# Patient Record
Sex: MALE | Race: WHITE | NOT HISPANIC OR LATINO | Employment: OTHER | URBAN - METROPOLITAN AREA
[De-identification: names, ages, dates, MRNs, and addresses within clinical notes are randomized per-mention and may not be internally consistent; named-entity substitution may affect disease eponyms.]

---

## 2017-08-07 ENCOUNTER — OFFICE VISIT (OUTPATIENT)
Dept: OTOLARYNGOLOGY | Facility: CLINIC | Age: 62
End: 2017-08-07

## 2017-08-07 VITALS — BODY MASS INDEX: 39.4 KG/M2 | WEIGHT: 260 LBS | HEIGHT: 68 IN

## 2017-08-07 DIAGNOSIS — R49.0 MUSCLE TENSION DYSPHONIA: Primary | ICD-10-CM

## 2017-08-07 DIAGNOSIS — R49.0 DYSPHONIA: Primary | ICD-10-CM

## 2017-08-07 RX ORDER — LISINOPRIL AND HYDROCHLOROTHIAZIDE 12.5; 2 MG/1; MG/1
TABLET ORAL
COMMUNITY
Start: 2017-03-14 | End: 2017-08-28

## 2017-08-07 RX ORDER — IBUPROFEN 800 MG/1
800 TABLET, FILM COATED ORAL
COMMUNITY
Start: 2014-12-22 | End: 2019-02-08

## 2017-08-07 RX ORDER — CITALOPRAM HYDROBROMIDE 20 MG/1
20 TABLET ORAL
COMMUNITY
Start: 2016-12-27 | End: 2017-10-06

## 2017-08-07 ASSESSMENT — PAIN SCALES - GENERAL: PAINLEVEL: NO PAIN (0)

## 2017-08-07 NOTE — LETTER
8/7/2017       RE: Jarad Javier  [address]  [facility] [place] [address]     Dear Colleague,    Thank you for referring your patient, Jarad Javier, to the [facility]. Please see a copy of my visit note below.    [facility]  Michoacano Azul Jr., M.D., F.A.C.S.  Liseth Cordova M.D., M.P.H.  Carolina Abraham, Ph.D., CCC/SLP  Deandra Hoffman M.M. (voice), M.A., CCC/SLP  Joe Cortes M.M. (voice), M.A., CCC/SLP    Patient: Jarad Javier  Date of Visit: 8/7/2017    CHIEF COMPLAINT: Dysphonia    HISTORY  Jarad Javier was seen for initial voice evaluation and laryngeal examination in conjunction with a visit to Dr. Cordova.  Please refer to Dr. Cordova s dictation for additional history and impressions. Salient details of his history are as follows:    Mr. Javier is a 61 year old male with a history of dysphonia.    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    CURRENT SYMPTOMS INCLUDE  VOICE    10 year Hx of gradually worsening voice issues    Increased effort while talking and singing; noticed that his singing became more difficult and has significantly affected his confidence.    Singing in choir ([employer]) and having a yearly voice check is very stressful; especially while singing solos.  It was suggested that he may need to transition to Tenor 2 during his choir voice check last year, but he does not want to make the transition.    Voice fatigues easily (becoming hoarse), breaks, change in pitch and range.    Noted that he does not have any formal training/ private voice lessons for his singing voice.    Currently retired; tends to be quiet for long periods of his  day    Recently retired as a school psychologist in ; minimal speaking  COUGH/THROAT CLEARING    Seasonal coughing; worst in dry weather  SWALLOWING/THROAT SYMPTOMS    Denies any swallowing issues    Experiences a chronic dry throat sensation; has used Celexa for a while and tries to hydrate well    Denies any associated throat discomfort with speaking or singing.  BREATHING    n/a  ADDITIONAL    Daily heart burn symptoms; no consistent medications used    Feels constant drainage in the Winter and Spring out the front of his nose.     OTHER PERTINENT HISTORY    Please also refer to Dr. Cordova s dictation for additional history and impressions.    Lisniopril; denies any connection to his cough    Celexa - stable  Past Medical History:   Diagnosis Date     Anxiety     I take Citalopram     Arthritis     Some in hands but mostly feet, big toe joints     Gastroesophageal reflux disease ?    Take antacids when needed     Hypertension 1970's    Borderline high to high, white coat syndrome     Past Surgical History:   Procedure Laterality Date     ADENOIDECTOMY  's    When I was about 8     TONSILLECTOMY  's    When I was about 8       OBJECTIVE  PATIENT REPORTED MEASURES    Effort to talk: 2 / 10 (0-10 in which 10 represents maximal effort)    Effort to sin / 10 (0-10 in which 10 represents maximal effort)    Voice quality: 7 / 10 (0-10 in which 10 represents best possible voice)  Patient Supplied Answers To VHI Questionnaire  Voice Handicap Index (VHI-10) 2017   How often do you have any of the following symptoms:  Indigestion, heartburn, chest pain, stomach acid coming up, and/or tasting acid in your mouth or throat? Daily   (F1) My voice makes it difficult for people to hear me. Never   (F2) People have difficulty understanding me in a noisy room. Almost never   (F8) My voice difficulties restrict my personal and social life. Never   (F9) I feel left out of conversations because of my  "voice. Never   (F10) My voice problem causes me to lose income. Never   (P5) I feel as though I have to strain to produce voice. Sometimes   (P6) The clarity of my voice is unpredictable. Sometimes   (E4) My voice problem upsets me. Almost always   (E6) My voice makes me feel handicapped. Almost always   (P3) People ask, \"What's wrong with your voice?\" Never   VHI Total Score 11       Patient Supplied Answers To CSI Questionnaire  Cough Severity Index (CSI) 7/24/2017   My cough is worse when I lie down. Almost never   My coughing problem causes me to restrict my personal and social life. Never   I tend to avoid places because of my cough problem. Never   I feel embarrassed because of my coughing problem. Almost never   People ask, \"What's wrong?\" because I cough a lot. Never   I run out of air when I cough. Never   My coughing problem affects my voice. Almost never   My coughing problem limits my physical activity. Never   My coughing problem upsets me. Never   People ask me if I am sick because I cough a lot. Almost never   CSI Total Score 4       Patient Supplied Answers To EAT Questionnaire  Eating Assessment Tool (EAT-10) 7/24/2017   My swallowing problem has caused me to lose weight. 0   My swallowing problem interferes with my ability to go out for meals. 0   Swallowing solids takes extra effort. 0   Swallowing pills takes extra effort. 0   Swallowing is painful. 0   The pleasure of eating is affected by my swallowing. 0   When I swallow food sticks in my throat. 0   I cough when I eat. 0   Swallowing is stressful. 0   How often do things go down the wrong way when you swallow? Rarely   Have you had pneumonia, bronchitis, or another severe lung infection in the last 6 months? No   EAT Total Score 0     PERCEPTUAL EVALUATION (CPT 30898)  POSTURE / TENSION:     upper body    BREATHING:     appears within normal limits and adequate     thoracic muscle use pattern     LARYNGEAL PALPATION:     firm " musculature    reduced thyrohyoid space    VOICE:    Roughness: Mild Intermittent    Breathiness: WNL    Strain: WNL with speech    Loudness    Conversational speech:  WNL    Projected speech:  WNL    Pitch:    Conversational speech:  WNL; sounds comfortable and in a typical range for a Tenor voice    Pitch glide: middle passaggio break; mild to moderate roughness, breathiness and strain in upper range.    Resonance:    Conversational speech:  WNL    Singing vs. Speech:  Demonstrates an improved voice quality while singing    CAPE-V Overall Severity:  25/100;  Global Dysphonia Severity:  52/100    COUGH/THROAT CLEARING:    Not observed    LARYNGEAL EXAMINATION  Procedure: Flexible endoscopy with chip-tip technology with stroboscopy, left nostril; topical anesthesia with 3% Lidocaine and 0.25% phenylephrine was applied.   Performed by: Dr. Cordova   The laryngeal and pharyngeal structures were evaluated for gross appearance, mobility, function, and focal lesions / abnormalities of the associated mucosa.  Stroboscopy was warranted to evaluate closure, symmetry, and vibratory characteristics of the vocal folds.  All findings were within normal limits with the exception of the following salient features:     Relatively healthy larynx with normal mobility    Mild anterior glottic gap intermittently observed    Mild to moderate supraglottic hyperfunction.    Stroboscopy demonstrates:    Amplitude: Mildly reduced bilaterally    Symmetry: Mild intermittent asymmetry    Mucosal wave: WNL    Phase: mild intermittent phase asymmetry    Mild anterior glottic gap observed.  It is difficult to determine whether it is related to muscle tension, or mild atrophy, but inhalation phonation suggests that it is related to muscle tension.  In addition:    Glottic adduction improved with glottic coups, as well as verbal cues to improve coordination between breath flow and speech.    Glides, and resonant phonemes /m/ and /n/ were more  "facilitating than \"ng\".      Base of tongue tension was noted during his laryngeal exam today.      Abducted view of a healthy larynx    The laryngeal exam was reviewed with Mr. Javier, and I provided pertinent explanations, as well as written and oral information.    ASSESSMENT / PLAN  IMPRESSIONS: R49.0 (Dysphonia)     Laryngeal evaluation demonstrated supraglottic hyperfunction.    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    Dysphonia/discomfort is accounted for by the hyperfunction and imbalanced function of the intrinsic and extrinsic laryngeal musculature      Cough/throat clear are accounted for by the hypersensitivity of the larynx and pharynx as evidenced by case history, patient complaints and absence of other organic findings; hypersensitivity is compounded by imbalance in the function of the intrinsic and extrinsic laryngeal musculature during connected speech  STIMULABILITY: results of therapy probes during perceptual and laryngeal evaluation demonstrate improvement with use of forward resonant stimuli, coordination of respiration and phonation, use of glottic coup to promote vocal fold closure, use of yawn sigh and use of inhalation phonation  RECOMMENDATIONS:     A course of speech therapy is recommended to optimize vocal technique, improve voice quality, promote reduced discomfort, effort and fatigue and help reduce mucosal irritation.    He demonstrates a Good prognosis for improvement given adherence to therapeutic recommendations. Therapeutic     Positive indicators: motivation; positive response to therapeutic probes    Negative indicators: none    DURATION / FREQUENCY: 2 one-hour weekly sessions, followed by 2 bi-weekly sessions.  A total of 6-8 sessions may be necessary to resolve symptoms to within normal limits.    GOALS:  Patient goal:   To improve and maintain a healthy voice quality  To understand the " problem and fix it as much as possible  To recover his former voice quality for all daily activities    Short-term goal(s): Within the first 4 sessions, Mr. Javier:  1. will be able to independently list key factors in maintenance of good vocal hygiene with 80% accuracy, and report on their use outside the therapy room.  2. will utilize silent inhalation with good low-respiratory engagement 75% of the time during therapy tasks with minimal clinician support  3. will demonstrate semi-occluded vocal tract (SOVT) exercises with at least 80% accuracy with no clinician support    Long-term goal(s): In 6 months, Mr. Javier will:  1. Report a week of typical vocal activities, in which dysphonia and discomfort do not exceed a level of 3 out of 10, 80% of the time     This treatment plan was developed with the patient who agreed with the recommendations.    TOTAL SERVICE:  EVALUATION OF VOICE AND RESONANCE: (64894): 45 minutes    NO CHARGE FACILITY FEE (26501)    Deandra Hoffman M.M. (voice), M.A., CCC/SLP  Speech-Language Pathologist  [facility]  [phone]    Again, thank you for allowing me to participate in the care of your patient.      Sincerely,    Deandra Hoffman, SLP

## 2017-08-07 NOTE — LETTER
8/7/2017      RE: Jarad Javier  [address]  [facility] [address]       Dear Colleague:    I had the pleasure of meeting Jarad Javier in consultation at the [facility] of the [facility] on a self-referred basis, for evaluation of dysphonia. The note from our visit follows.  I appreciate the opportunity to participate in the care of this pleasant patient.    Please feel free to contact me with any questions.    Sincerely yours,    Liseth Cordova M.D., M.P.H.  , Laryngology  Director, [facility]  Otolaryngology- Head & Neck Surgery  [phone]          =====    History of Present Illness:   Jarad Javier is a pleasant 61-year-old male with a history of arthritis, anxiety, and GERD who presents with a 10 year history of dysphonia. Symptoms include increased effort to talk/sing, dry throat, voice tires easily, voice breaks, change in vocal pitch and change in range.      Voice  He reports voice problems for about 10 years, with gradual onset. He has noted that signing is becoming more difficult. The problem is worsening over time.  His typical voice use is undemanding. He reports that he recently retired so his vocal demands have decreased, but he sings in choir so the problem affects his quality of life. The problem causes stress and he has difficulty with even moderately high notes. He has passagio difficulties. He wonders if he has an anatomical issue or if this is more related to technique or the fact that he gets stressed and twisted into knots about the problem.      Swallowing  No concerns.       Cough/Throat-clearing  He notes some cough/ throat-clearing especially at certain times of the year, especially in the winter when it feels dry, and in the fall and spring as well. At those times he develops a dry cough along with rhinitis. This problem did not coincide with starting  lisinopril. A prior allergy workup was negative. He does occasionally do saline nasal rinses.      Breathing  No concerns.       Throat discomfort  No concerns.       Reflux-type symptoms  He experiences heartburn/indigestion daily. He is sometimes taking reflux medications. He does note that omeprazole helps. He has not noted any relationship between reflux symptoms and vocal difficulty. He does note that he is aware of his dietary triggers.      MEDICATIONS:     Current Outpatient Prescriptions   Medication Sig Dispense Refill     cholecalciferol (VITAMIN D3) 5000 UNITS CAPS capsule Take 5,000 Units by mouth       citalopram (CELEXA) 20 MG tablet Take 20 mg by mouth       coenzyme Q-10 10 MG CAPS        ibuprofen (ADVIL/MOTRIN) 800 MG tablet Take 800 mg by mouth       lisinopril-hydrochlorothiazide (PRINZIDE/ZESTORETIC) 20-12.5 MG per tablet TAKE ONE TABLET BY MOUTH EVERY DAY       omeprazole (PRILOSEC) 20 MG CR capsule Take 20 mg by mouth         ALLERGIES:  No Known Allergies    PAST MEDICAL HISTORY:   Past Medical History:   Diagnosis Date     Anxiety 2013    I take Citalopram     Arthritis 2002    Some in hands but mostly feet, big toe joints     Gastroesophageal reflux disease 2012?    Take antacids when needed     Hypertension 1970's    Borderline high to high, white coat syndrome        PAST SURGICAL HISTORY:   Past Surgical History:   Procedure Laterality Date     ADENOIDECTOMY  1960's    When I was about 8     TONSILLECTOMY  1960's    When I was about 8       HABITS/SOCIAL HISTORY:    Social History   Substance Use Topics     Smoking status: Never Smoker     Smokeless tobacco: Never Used     Alcohol use Yes      Comment: Very occasional         FAMILY HISTORY:    Family History   Problem Relation Age of Onset     Hypertension Mother      Asthma Mother        REVIEW OF SYSTEMS:  The patient completed a comprehensive 11 point review of systems (below), which was reviewed. Positives are as noted below;  pertinent findings are as noted in the HPI.     Patient Supplied Answers to Review of Systems   ENT ROS 7/24/2017   Ears, Nose, Throat Nasal congestion or drainage   Gastrointestinal/Genitourinary Heartburn/indigestion   Musculoskeletal Swollen joints, Back pain       PHYSICAL EXAMINATION:  General: The patient was alert and conversant, and in no acute distress.    Eyes: PERRL, conjunctiva and lids normal, sclera nonicteric.  Nose: Anterior rhinoscopy: no gross abnormalities. no  bleeding; no  mucopurulence; septum grossly normal, mild mucoid drainage and/or crusting.  Oral cavity/oropharynx: No masses or lesions. Dentition in fair condition. Floor of mouth and oral tongue soft to palpation. Tongue mobility and palate elevation intact and symmetric.  Ears: Normal auricles, external auditory canals bilaterally. Visualized portions of tympanic membranes normal bilaterally.   Neck: No palpable cervical lymphadenopathy. There was mild tenderness to palpation of the thyrohyoid space, which was narrow. No obvious thyroid abnormality. Landmarks palpable. Bilateral moderate posterior neck tightness.  Resp: Breathing comfortably, no stridor or stertor.  Neuro: Symmetric facial function. Other cranial nerves as documented above.  Psych: Normal affect, pleasant and cooperative.  Voice/speech: Mild dysphonia characterized by breathiness and roughness, occasional mild tremulous quality.  Extremities: No cyanosis, clubbing, or edema of the upper extremities.     Intake scores  Total Score for Last Patient-Answered VHI Questionnaire  VHI Total Score 7/24/2017   VHI Total Score 11     Total Score for Last Patient-Answered EAT Questionnaire  EAT Total Score 7/24/2017   EAT Total Score 0     Total Score for Last Patient-Answered CSI Questionnaire  CSI Total Score 7/24/2017   CSI Total Score 4       PROCEDURE:   Flexible fiberoptic laryngoscopy and laryngovideostroboscopy  Indications: This procedure was warranted to evaluate the  patient's laryngeal function. Risks, benefits, and alternatives were discussed.  Description: After written informed consent was obtained, a time-out was performed to confirm patient identity, procedure, and procedure site. Topical 3% lidocaine with 0.25% phenylephrine was applied to the nasal cavities. I performed the endoscopy and no complications were apparent. Continuous and stroboscopic light were utilized to assess routine phonation and variable frequency phonation.  Performed by: Liseth Cordova MD MPH  SLP: Deandra Hoffman MM, MA, CCC-SLP  Findings: Normal nasopharynx. Normal base of tongue, valleculae, and epiglottis. Vocal fold mobility: right: normal; left: normal. Medial edges of the vocal folds: smooth and straight. No focal mucosal lesions were observed on the true vocal folds. Glissade produced appropriate elongation. There was mild to moderate supraglottic recruitment with connected speech. Mucosa of false vocal folds, aryepiglottic folds, piriform sinuses, and posterior glottis unremarkable. Airway was patent. Response to the therapy probes was good.      The addition of stroboscopy allowed evaluation of the mucosal wave.   Amplitude: right: normal; left: normal. Symmetry: intermittent symmetry. Closure pattern: complete. Closure plane: at glottic level. Phase distribution: normal. Very subtle medial vocal fold irregularity bilaterally. Visible improvement with instructions to reduce effort and increase airflow.      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 Although his voice perceptually is sometimes suggestive of bowing, on stroboscopy he achieved good vibratory function with instruction.     I recommended speech therapy as initial primary management, with goals including improving laryngeal hygiene, improving laryngeal efficiency, improving respiratory/phonatory coordination and  improving phonatory mechanics.     He will return as needed. I appreciate the opportunity to participate in the care of this pleasant patient.              Liseth Cordova MD

## 2017-08-07 NOTE — NURSING NOTE
Chief Complaint   Patient presents with     Consult     dysphonia     Nikole Ramsey Medical Assistant

## 2017-08-07 NOTE — MR AVS SNAPSHOT
After Visit Summary   8/7/2017    Jarad Javier    MRN: [id number]           Patient Information     Date Of Birth          1955        Visit Information        Provider Department      8/7/2017 10:10 AM Deandra Hoffman SLP [facility]         Follow-ups after your visit        Your next 10 appointments already scheduled     Aug 21, 2017  8:00 AM CDT   (Arrive by 7:45 AM)   NEW SLEEP [facility]ICE with [facility]    [address] [place] [id number] [phone]            Sep 05, 2017  2:00 PM CDT   (Arrive by 1:45 PM)   NEW SLEEP VOICE with [facility]    [address] [place] [id number] [phone]            Sep 18, 2017  8:00 AM CDT   (Arrive by 7:45 AM)   NEW SLEEP VOICE with [facility]    [address] [place] [address] [id number] [phone]            Oct 09, 2017  8:00 AM CDT   (Arrive by 7:45 AM)   NEW SLEEP VOICE with [facility]    [address] [place] [address] [id number] [phone]              Who to contact     Please call your clinic at [phone] to:    Ask questions about your health    Make or cancel appointments    Discuss your medicines    Learn about your test results    Speak to your doctor   If you have compliments or concerns about an experience at your clinic, or if you wish to file a complaint, please contact [facility] Patient Relations at [phone] or email us at [email or web address]         Additional Information About Your Visit        MyChart Information     MyChart gives you secure access to your electronic health record. If you see a primary care provider, you can also send  messages to your care team and make appointments. If you have questions, please call your primary care clinic.  If you do not have a primary care provider, please call [phone] and they will assist you.      [employer] is an electronic gateway that provides easy, online access to your medical records. With [employer], you can request a clinic appointment, read your test results, renew a prescription or communicate with your care team.     To access your existing account, please contact your [facility] or call [phone] for assistance.        Care EveryWhere ID     This is your Care EveryWhere ID. This could be used by other organizations to access your [facility] medical records  [id number]         Blood Pressure from Last 3 Encounters:   No data found for BP    Weight from Last 3 Encounters:   08/07/17 117.9 kg (260 lb)              Today, you had the following     No orders found for display       Primary Care Provider    Doctor Unknown, MD       No address on file        Equal Access to Services     : Hadii aad enedelia lantiguao Sojose, waaxda luqadaha, qaybta kaalmada adeabrahanyada, ethan gould . So [facility] [phone].    ATENCIÓN: Si habla español, tiene a ramirez disposición servicios gratuitos de asistencia lingüística. Llame al [phone].    We comply with applicable federal civil rights laws and [place] laws. We do not discriminate on the basis of race, color, national origin, age, disability sex, sexual orientation or gender identity.            Thank you!     Thank you for choosing [facility]  for your care. Our goal is always to provide you with excellent care. Hearing back from our patients is one way we can continue to improve our services. Please take a few minutes to complete the written survey that you may receive in the mail after your visit with us. Thank you!             Your Updated Medication List - Protect others around you:  Learn how to safely use, store and throw away your medicines at www.disposemymeds.org.          This list is accurate as of: 8/7/17 11:19 AM.  Always use your most recent med list.                   Brand Name Dispense Instructions for use Diagnosis    cholecalciferol 5000 UNITS Caps capsule    vitamin D3     Take 5,000 Units by mouth        citalopram 20 MG tablet    celeXA     Take 20 mg by mouth        coenzyme Q-10 10 MG Caps           ibuprofen 800 MG tablet    ADVIL/MOTRIN     Take 800 mg by mouth        lisinopril-hydrochlorothiazide 20-12.5 MG per tablet    PRINZIDE/ZESTORETIC     TAKE ONE TABLET BY MOUTH EVERY DAY        omeprazole 20 MG CR capsule    priLOSEC     Take 20 mg by mouth

## 2017-08-07 NOTE — NURSING NOTE
Procedure: Upper aerodigestive tract endoscopy, Flexible or rigid laryngoscopy, possible stroboscopy, possible flexible endoscopic evaluation of swallowing   Reason: symptoms requiring examination   PREPROCEDURE:   Yes Patient ID verified with 2 identifiers (name and  or MRN)   Yes: Procedure and site verified with patient/designee (when able)   Yes: Accurate consent documentation in medical record   No: Marking not required. Reason: [ Procedure does not require site marking. ][ Provider is in continuous attendance with the patient from consent through completion of procedure. ][ Marking unable or refused by patient (see scanned diagram).   TIME OUT:   Yes: Time-Out performed immediately prior to starting procedure, including verbal and active participation of all team members addressing:   * Correct patient identity   * Confirmed that the correct side and site are marked   * An accurate procedure consent form   * Agreement on the procedure to be done   * Correct patient position   * Relevant images and results are properly labeled and appropriately displayed   * The need to administer antibiotics or fluids for irrigation purposes during the procedure as applicable   * Safety precations based on patient history or medication use   DURING PROCEDURE: Verification of correct person, site, and procedure occurs any time the responsibility for care of the patient is transferred to another member of the care team.   Jena West RN  P Otolaryngology/Head & Neck Surgery

## 2017-08-07 NOTE — PATIENT INSTRUCTIONS
1.  You were seen in the ENT Clinic today by Dr. Cordova.  If you have any questions or concerns after your appointment, please call [phone].  Press option #1 for scheduling related needs.  Press option #3 for Nurse advice.  2.  Please initiate speech therapy at the [facility] - [facility].     Jena ARANDA, RN  [facility] ENT   Head & Neck Surgery

## 2017-08-07 NOTE — PROGRESS NOTES
Dear Colleague:    I had the pleasure of meeting Jarad Javier in consultation at the [facility] of the [facility] on a self-referred basis, for evaluation of dysphonia. The note from our visit follows.  I appreciate the opportunity to participate in the care of this pleasant patient.    Please feel free to contact me with any questions.    Sincerely yours,    Liseth Cordova M.D., M.P.H.  , Laryngology  Director, [facility]  Otolaryngology- Head & Neck Surgery  [phone]          =====    History of Present Illness:   Jarad Javier is a pleasant 61-year-old male with a history of arthritis, anxiety, and GERD who presents with a 10 year history of dysphonia. Symptoms include increased effort to talk/sing, dry throat, voice tires easily, voice breaks, change in vocal pitch and change in range.      Voice  He reports voice problems for about 10 years, with gradual onset. He has noted that signing is becoming more difficult. The problem is worsening over time.  His typical voice use is undemanding. He reports that he recently retired so his vocal demands have decreased, but he sings in choir so the problem affects his quality of life. The problem causes stress and he has difficulty with even moderately high notes. He has passagio difficulties. He wonders if he has an anatomical issue or if this is more related to technique or the fact that he gets stressed and twisted into knots about the problem.      Swallowing  No concerns.       Cough/Throat-clearing  He notes some cough/ throat-clearing especially at certain times of the year, especially in the winter when it feels dry, and in the fall and spring as well. At those times he develops a dry cough along with rhinitis. This problem did not coincide with starting lisinopril. A prior allergy workup was negative. He does occasionally do saline nasal  rinses.      Breathing  No concerns.       Throat discomfort  No concerns.       Reflux-type symptoms  He experiences heartburn/indigestion daily. He is sometimes taking reflux medications. He does note that omeprazole helps. He has not noted any relationship between reflux symptoms and vocal difficulty. He does note that he is aware of his dietary triggers.      MEDICATIONS:     Current Outpatient Prescriptions   Medication Sig Dispense Refill     cholecalciferol (VITAMIN D3) 5000 UNITS CAPS capsule Take 5,000 Units by mouth       citalopram (CELEXA) 20 MG tablet Take 20 mg by mouth       coenzyme Q-10 10 MG CAPS        ibuprofen (ADVIL/MOTRIN) 800 MG tablet Take 800 mg by mouth       lisinopril-hydrochlorothiazide (PRINZIDE/ZESTORETIC) 20-12.5 MG per tablet TAKE ONE TABLET BY MOUTH EVERY DAY       omeprazole (PRILOSEC) 20 MG CR capsule Take 20 mg by mouth         ALLERGIES:  No Known Allergies    PAST MEDICAL HISTORY:   Past Medical History:   Diagnosis Date     Anxiety 2013    I take Citalopram     Arthritis 2002    Some in hands but mostly feet, big toe joints     Gastroesophageal reflux disease 2012?    Take antacids when needed     Hypertension 1970's    Borderline high to high, white coat syndrome        PAST SURGICAL HISTORY:   Past Surgical History:   Procedure Laterality Date     ADENOIDECTOMY  1960's    When I was about 8     TONSILLECTOMY  1960's    When I was about 8       HABITS/SOCIAL HISTORY:    Social History   Substance Use Topics     Smoking status: Never Smoker     Smokeless tobacco: Never Used     Alcohol use Yes      Comment: Very occasional         FAMILY HISTORY:    Family History   Problem Relation Age of Onset     Hypertension Mother      Asthma Mother        REVIEW OF SYSTEMS:  The patient completed a comprehensive 11 point review of systems (below), which was reviewed. Positives are as noted below; pertinent findings are as noted in the HPI.     Patient Supplied Answers to Review of  Systems   ENT ROS 7/24/2017   Ears, Nose, Throat Nasal congestion or drainage   Gastrointestinal/Genitourinary Heartburn/indigestion   Musculoskeletal Swollen joints, Back pain       PHYSICAL EXAMINATION:  General: The patient was alert and conversant, and in no acute distress.    Eyes: PERRL, conjunctiva and lids normal, sclera nonicteric.  Nose: Anterior rhinoscopy: no gross abnormalities. no  bleeding; no  mucopurulence; septum grossly normal, mild mucoid drainage and/or crusting.  Oral cavity/oropharynx: No masses or lesions. Dentition in fair condition. Floor of mouth and oral tongue soft to palpation. Tongue mobility and palate elevation intact and symmetric.  Ears: Normal auricles, external auditory canals bilaterally. Visualized portions of tympanic membranes normal bilaterally.   Neck: No palpable cervical lymphadenopathy. There was mild tenderness to palpation of the thyrohyoid space, which was narrow. No obvious thyroid abnormality. Landmarks palpable. Bilateral moderate posterior neck tightness.  Resp: Breathing comfortably, no stridor or stertor.  Neuro: Symmetric facial function. Other cranial nerves as documented above.  Psych: Normal affect, pleasant and cooperative.  Voice/speech: Mild dysphonia characterized by breathiness and roughness, occasional mild tremulous quality.  Extremities: No cyanosis, clubbing, or edema of the upper extremities.     Intake scores  Total Score for Last Patient-Answered VHI Questionnaire  VHI Total Score 7/24/2017   VHI Total Score 11     Total Score for Last Patient-Answered EAT Questionnaire  EAT Total Score 7/24/2017   EAT Total Score 0     Total Score for Last Patient-Answered CSI Questionnaire  CSI Total Score 7/24/2017   CSI Total Score 4       PROCEDURE:   Flexible fiberoptic laryngoscopy and laryngovideostroboscopy  Indications: This procedure was warranted to evaluate the patient's laryngeal function. Risks, benefits, and alternatives were  discussed.  Description: After written informed consent was obtained, a time-out was performed to confirm patient identity, procedure, and procedure site. Topical 3% lidocaine with 0.25% phenylephrine was applied to the nasal cavities. I performed the endoscopy and no complications were apparent. Continuous and stroboscopic light were utilized to assess routine phonation and variable frequency phonation.  Performed by: Liseth Cordova MD MPH  SLP: Deandra Hoffman MM, MA, CCC-SLP  Findings: Normal nasopharynx. Normal base of tongue, valleculae, and epiglottis. Vocal fold mobility: right: normal; left: normal. Medial edges of the vocal folds: smooth and straight. No focal mucosal lesions were observed on the true vocal folds. Glissade produced appropriate elongation. There was mild to moderate supraglottic recruitment with connected speech. Mucosa of false vocal folds, aryepiglottic folds, piriform sinuses, and posterior glottis unremarkable. Airway was patent. Response to the therapy probes was good.      The addition of stroboscopy allowed evaluation of the mucosal wave.   Amplitude: right: normal; left: normal. Symmetry: intermittent symmetry. Closure pattern: complete. Closure plane: at glottic level. Phase distribution: normal. Very subtle medial vocal fold irregularity bilaterally. Visible improvement with instructions to reduce effort and increase airflow.      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 Although his voice perceptually is sometimes suggestive of bowing, on stroboscopy he achieved good vibratory function with instruction.     I recommended speech therapy as initial primary management, with goals including improving laryngeal hygiene, improving laryngeal efficiency, improving respiratory/phonatory coordination and improving phonatory mechanics.     He will return as needed. I  appreciate the opportunity to participate in the care of this pleasant patient.

## 2017-08-07 NOTE — MR AVS SNAPSHOT
After Visit Summary   8/7/2017    Jarad Javier    MRN: [id number]           Patient Information     Date Of Birth          1955        Visit Information        Provider Department      8/7/2017 10:10 AM Liseth Cordova MD [facility] Ear Nose and Throat        Today's Diagnoses     Muscle tension dysphonia    -  1      Care Instructions    1.  You were seen in the ENT Clinic today by Dr. Cordova.  If you have any questions or concerns after your appointment, please call [phone].  Press option #1 for scheduling related needs.  Press option #3 for Nurse advice.  2.  Please initiate speech therapy at the [facility].     Jena ARANDA, RN  [facility] ENT   Head & Neck Surgery               Follow-ups after your visit        Additional Services     OTOLARYNGOLOGY REFERRAL       SPEECH-LANGUAGE PATHOLOGY SERVICE(S) REQUESTED:  Evaluate and treat    Carolina Abraham, Ph.D., CCC/SLP  Speech-Language Pathologist  Director, [facility]  [phone]    Deandra Hoffman M.M., M.A., CCC/SLP  Speech-Language Pathologist  [facility]  [phone]                  Your next 10 appointments already scheduled     Aug 21, 2017  8:00 AM CDT   (Arrive by 7:45 AM)   NEW SLEEP VOICE with [facility] ([facility])    [address] [place] [id number] [phone]            Sep 05, 2017  2:00 PM CDT   (Arrive by 1:45 PM)   NEW SLEEP VOICE with [facility] ([facility])    [address] [place] [id number] [phone]            Sep 18, 2017  8:00 AM CDT   (Arrive by 7:45 AM)   NEW SLEEP VOICE with [facility] ([facility])    [address] [place] [id number] [phone]            Oct 09, 2017  8:00 AM CDT  "  (Arrive by 7:45 AM)   NEW SLEEP VOICE with AAKASH [facility]   [facility] ([facility])    [address] [facility] [address] [facility] [address] [phone]              Who to contact     Please call your clinic at [phone] to:    Ask questions about your health    Make or cancel appointments    Discuss your medicines    Learn about your test results    Speak to your doctor   If you have compliments or concerns about an experience at your clinic, or if you wish to file a complaint, please contact [facility] Patient Relations at [phone] or email us at [email or web address]         Additional Information About Your Visit        [employer] Information     [employer] gives you secure access to your electronic health record. If you see a primary care provider, you can also send messages to your care team and make appointments. If you have questions, please call your primary care clinic.  If you do not have a primary care provider, please call [phone] and they will assist you.      [employer] is an electronic gateway that provides easy, online access to your medical records. With [employer], you can request a clinic appointment, read your test results, renew a prescription or communicate with your care team.     To access your existing account, please contact your [facility] or call [phone] for assistance.        Care EveryWhere ID     This is your Care EveryWhere ID. This could be used by other organizations to access your [facility] medical records  [id number]        Your Vitals Were     Height BMI (Body Mass Index)                1.727 m (5' 8\") 39.53 kg/m2           Blood Pressure from Last 3 Encounters:   No data found for BP    Weight from Last 3 Encounters:   08/07/17 117.9 kg (260 lb)              We Performed the Following     IMAGESTREAM RECORDING ORDER     LARYNGOSCOPY FLEX/RIGID W STROBOSCOPY     " OTOLARYNGOLOGY REFERRAL        Primary Care Provider    Doctor Unknown, MD       No address on file        Equal Access to Services     [facility]: Hadii ramiro mcdaniels naga Collins, loganda diontelito, gustavo walker sonnyhonorio, waxnelly saumyain hayaakip dennisabrahan ramirez laSimonbharati manas. So [facility] [phone].    ATENCIÓN: Si habla español, tiene a ramirez disposición servicios gratuitos de asistencia lingüística. Llame al [phone].    We comply with applicable federal civil rights laws and [place] laws. We do not discriminate on the basis of race, color, national origin, age, disability sex, sexual orientation or gender identity.            Thank you!     Thank you for choosing [facility]  for your care. Our goal is always to provide you with excellent care. Hearing back from our patients is one way we can continue to improve our services. Please take a few minutes to complete the written survey that you may receive in the mail after your visit with us. Thank you!             Your Updated Medication List - Protect others around you: Learn how to safely use, store and throw away your medicines at www.disposemymeds.org.          This list is accurate as of: 8/7/17 11:59 PM.  Always use your most recent med list.                   Brand Name Dispense Instructions for use Diagnosis    cholecalciferol 5000 UNITS Caps capsule    vitamin D3     Take 5,000 Units by mouth        citalopram 20 MG tablet    celeXA     Take 20 mg by mouth        coenzyme Q-10 10 MG Caps           ibuprofen 800 MG tablet    ADVIL/MOTRIN     Take 800 mg by mouth        lisinopril-hydrochlorothiazide 20-12.5 MG per tablet    PRINZIDE/ZESTORETIC     TAKE ONE TABLET BY MOUTH EVERY DAY        omeprazole 20 MG CR capsule    priLOSEC     Take 20 mg by mouth

## 2017-08-07 NOTE — PROGRESS NOTES
[facility] VOICE CLINIC  Michoacano Azul Jr., M.D., F.A.C.S.  Liseth Cordova M.D., M.P.H.  Carolina Abraham, Ph.D., CCC/SLP  Deandra Hoffman M.M. (voice), M.MARCUS., CCC/SLP  Joe Cortes M.M. (voice), M.A., CCC/SLP    Patient: Jarad Javier  Date of Visit: 8/7/2017    CHIEF COMPLAINT: Dysphonia    HISTORY  Jarad Javier was seen for initial voice evaluation and laryngeal examination in conjunction with a visit to Dr. Cordova.  Please refer to Dr. Cordova s dictation for additional history and impressions. Salient details of his history are as follows:    Mr. Javier is a 61 year old male with a history of dysphonia.    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    CURRENT SYMPTOMS INCLUDE  VOICE    10 year Hx of gradually worsening voice issues    Increased effort while talking and singing; noticed that his singing became more difficult and has significantly affected his confidence.    Singing in choir ([employer]) and having a yearly voice check is very stressful; especially while singing solos.  It was suggested that he may need to transition to Tenor 2 during his choir voice check last year, but he does not want to make the transition.    Voice fatigues easily (becoming hoarse), breaks, change in pitch and range.    Noted that he does not have any formal training/ private voice lessons for his singing voice.    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  BREATHING    n/a  ADDITIONAL    Daily heart burn symptoms; no consistent medications used    Feels constant drainage in the Winter and Spring out the front of his nose.     OTHER PERTINENT HISTORY    Please also refer to Dr. Cordova s dictation for additional history and impressions.    Lisniopril; denies any connection to his cough    Celexa - stable  Past Medical History:   Diagnosis Date     Anxiety     I take Citalopram     Arthritis     Some in hands but mostly feet, big toe joints     Gastroesophageal reflux disease ?    Take antacids when needed     Hypertension 's    Borderline high to high, white coat syndrome     Past Surgical History:   Procedure Laterality Date     ADENOIDECTOMY  's    When I was about 8     TONSILLECTOMY  's    When I was about 8       OBJECTIVE  PATIENT REPORTED MEASURES    Effort to talk: 2 / 10 (0-10 in which 10 represents maximal effort)    Effort to sin / 10 (0-10 in which 10 represents maximal effort)    Voice quality: 7 / 10 (0-10 in which 10 represents best possible voice)  Patient Supplied Answers To VHI Questionnaire  Voice Handicap Index (VHI-10) 2017   How often do you have any of the following symptoms:  Indigestion, heartburn, chest pain, stomach acid coming up, and/or tasting acid in your mouth or throat? Daily   (F1) My voice makes it difficult for people to hear me. Never   (F2) People have difficulty understanding me in a noisy room. Almost never   (F8) My voice difficulties restrict my personal and social life. Never   (F9) I feel left out of conversations because of my voice. Never   (F10) My voice problem causes me to lose income. Never   (P5) I feel as though I have to strain to produce voice. Sometimes   (P6) The clarity of my voice is unpredictable. Sometimes   (E4) My voice problem upsets me. Almost always   (E6) My voice makes me feel  "handicapped. Almost always   (P3) People ask, \"What's wrong with your voice?\" Never   VHI Total Score 11       Patient Supplied Answers To CSI Questionnaire  Cough Severity Index (CSI) 7/24/2017   My cough is worse when I lie down. Almost never   My coughing problem causes me to restrict my personal and social life. Never   I tend to avoid places because of my cough problem. Never   I feel embarrassed because of my coughing problem. Almost never   People ask, \"What's wrong?\" because I cough a lot. Never   I run out of air when I cough. Never   My coughing problem affects my voice. Almost never   My coughing problem limits my physical activity. Never   My coughing problem upsets me. Never   People ask me if I am sick because I cough a lot. Almost never   CSI Total Score 4       Patient Supplied Answers To EAT Questionnaire  Eating Assessment Tool (EAT-10) 7/24/2017   My swallowing problem has caused me to lose weight. 0   My swallowing problem interferes with my ability to go out for meals. 0   Swallowing solids takes extra effort. 0   Swallowing pills takes extra effort. 0   Swallowing is painful. 0   The pleasure of eating is affected by my swallowing. 0   When I swallow food sticks in my throat. 0   I cough when I eat. 0   Swallowing is stressful. 0   How often do things go down the wrong way when you swallow? Rarely   Have you had pneumonia, bronchitis, or another severe lung infection in the last 6 months? No   EAT Total Score 0     PERCEPTUAL EVALUATION (CPT 71670)  POSTURE / TENSION:     upper body    BREATHING:     appears within normal limits and adequate     thoracic muscle use pattern     LARYNGEAL PALPATION:     firm musculature    reduced thyrohyoid space    VOICE:    Roughness: Mild Intermittent    Breathiness: WNL    Strain: WNL with speech    Loudness    Conversational speech:  WNL    Projected speech:  WNL    Pitch:    Conversational speech:  WNL; sounds comfortable and in a typical range for a Tenor " "voice    Pitch glide: middle passaggio break; mild to moderate roughness, breathiness and strain in upper range.    Resonance:    Conversational speech:  WNL    Singing vs. Speech:  Demonstrates an improved voice quality while singing    CAPE-V Overall Severity:  25/100;  Global Dysphonia Severity:  52/100    COUGH/THROAT CLEARING:    Not observed    LARYNGEAL EXAMINATION  Procedure: Flexible endoscopy with chip-tip technology with stroboscopy, left nostril; topical anesthesia with 3% Lidocaine and 0.25% phenylephrine was applied.   Performed by: Dr. Cordova   The laryngeal and pharyngeal structures were evaluated for gross appearance, mobility, function, and focal lesions / abnormalities of the associated mucosa.  Stroboscopy was warranted to evaluate closure, symmetry, and vibratory characteristics of the vocal folds.  All findings were within normal limits with the exception of the following salient features:     Relatively healthy larynx with normal mobility    Mild anterior glottic gap intermittently observed    Mild to moderate supraglottic hyperfunction.    Stroboscopy demonstrates:    Amplitude: Mildly reduced bilaterally    Symmetry: Mild intermittent asymmetry    Mucosal wave: WNL    Phase: mild intermittent phase asymmetry    Mild anterior glottic gap observed.  It is difficult to determine whether it is related to muscle tension, or mild atrophy, but inhalation phonation suggests that it is related to muscle tension.  In addition:    Glottic adduction improved with glottic coups, as well as verbal cues to improve coordination between breath flow and speech.    Glides, and resonant phonemes /m/ and /n/ were more facilitating than \"ng\".      Base of tongue tension was noted during his laryngeal exam today.      Abducted view of a healthy larynx    The laryngeal exam was reviewed with Mr. Javier, and I provided pertinent explanations, as well as written and oral information.    ASSESSMENT / " PLAN  IMPRESSIONS: R49.0 (Dysphonia)     Laryngeal evaluation demonstrated supraglottic hyperfunction.    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    Dysphonia/discomfort is accounted for by the hyperfunction and imbalanced function of the intrinsic and extrinsic laryngeal musculature      Cough/throat clear are accounted for by the hypersensitivity of the larynx and pharynx as evidenced by case history, patient complaints and absence of other organic findings; hypersensitivity is compounded by imbalance in the function of the intrinsic and extrinsic laryngeal musculature during connected speech  STIMULABILITY: results of therapy probes during perceptual and laryngeal evaluation demonstrate improvement with use of forward resonant stimuli, coordination of respiration and phonation, use of glottic coup to promote vocal fold closure, use of yawn sigh and use of inhalation phonation  RECOMMENDATIONS:     A course of speech therapy is recommended to optimize vocal technique, improve voice quality, promote reduced discomfort, effort and fatigue and help reduce mucosal irritation.    He demonstrates a Good prognosis for improvement given adherence to therapeutic recommendations. Therapeutic     Positive indicators: motivation; positive response to therapeutic probes    Negative indicators: none    DURATION / FREQUENCY: 2 one-hour weekly sessions, followed by 2 bi-weekly sessions.  A total of 6-8 sessions may be necessary to resolve symptoms to within normal limits.    GOALS:  Patient goal:   To improve and maintain a healthy voice quality  To understand the problem and fix it as much as possible  To recover his former voice quality for all daily activities    Short-term goal(s): Within the first 4 sessions, Mr. Javier:  1. will be able to independently list key factors in maintenance of good vocal hygiene with 80% accuracy, and report  on their use outside the therapy room.  2. will utilize silent inhalation with good low-respiratory engagement 75% of the time during therapy tasks with minimal clinician support  3. will demonstrate semi-occluded vocal tract (SOVT) exercises with at least 80% accuracy with no clinician support    Long-term goal(s): In 6 months, Mr. Javier will:  1. Report a week of typical vocal activities, in which dysphonia and discomfort do not exceed a level of 3 out of 10, 80% of the time     This treatment plan was developed with the patient who agreed with the recommendations.    TOTAL SERVICE:  EVALUATION OF VOICE AND RESONANCE: (30767): 45 minutes    NO CHARGE FACILITY FEE (02561)    Deandra Hoffman M.M. (voice), M.A., CCC/SLP  Speech-Language Pathologist  [facility]  [phone]

## 2017-08-07 NOTE — LETTER
Date:August 15, 2017      Patient was self referred, no letter generated. Do not send.        [facility] Health Information

## 2017-08-14 ENCOUNTER — TRANSFERRED RECORDS (OUTPATIENT)
Dept: HEALTH INFORMATION MANAGEMENT | Facility: CLINIC | Age: 62
End: 2017-08-14

## 2017-08-14 LAB
CHOLEST SERPL-MCNC: 152 MG/DL
GLUCOSE SERPL-MCNC: 99 MG/DL (ref 61–99)
HDLC SERPL-MCNC: 36 MG/DL
LDLC SERPL CALC-MCNC: 91 MG/DL
TRIGL SERPL-MCNC: 126 MG/DL

## 2017-08-21 ENCOUNTER — OFFICE VISIT (OUTPATIENT)
Dept: OTOLARYNGOLOGY | Facility: CLINIC | Age: 62
End: 2017-08-21

## 2017-08-21 DIAGNOSIS — R49.0 DYSPHONIA: Primary | ICD-10-CM

## 2017-08-21 NOTE — LETTER
"8/21/2017      RE: Jarad Javier  [address]  [facility] [address]       [facility] VOICE [facility]  Michoacano Azul Jr., M.D., F.A.C.S.  Liseth Cordova M.D., M.P.H.  Carolina Abraham, Ph.D., CCC/SLP  Deandra Hoffman M.M. (voice), M.A., CCC/SLP  Joe Cortes M.M. (voice), M.A., CCC/SLP    [facility]  THERAPY PROGRESS REPORT    Patient: Jarad Javier  Date of Service: 8/21/2017  Date of Last Service: 8/7/17      I had the pleasure of seeing Mr. Javier for the first post-evaluation therapy session (CPT code 31805).  He was referred by his physician, Dr. Cordova, for medically necessary speech therapy to address a diagnosis of R49.0 (Dysphonia) in the context of an imbalance in function of the intrinsic and extrinsic muscles of the larynx.  His initial evaluation was performed by my colleague Deandra Hoffman CCC-SLP. Please refer to the initial evaluation report on 8/7/17 for complete details regarding diagnostic findings.    SUBJECTIVE:  Since his last sesion, Mr. Javier reports the following:     He states he has not had any change in his voice, which is expected, as no therapeutic suggestions were made at the time.    He states that his primary goal is to improve his confidence in his voice    Harder to hit higher notes    Feels that \"he isn't singing correctly\"    Instability beginning in mid-range    Soft dynamics at all ranges have become more challenging    OBJECTIVE:  Patient reported measures:  Question Session Date    8/21/17       Overall, I did my speech therapy exercises / techniques / strategies: NA       0 = n/a  1 = once or twice since last session  2 = three to five days per week 3 = almost every day  4 = one to two times per day  5 = three or more times per day   Since beginning your treatment, how confident do you feel in your ability to manage your current and future symptoms? NA       1 = Not at all   2 = Very little    3 = Somewhat   4 = Reasonably  5 = Very "   Considering the progress since your last appointment, please provide a response to the questions below based on the following options:   0 = N/A   1= Not at all   2 = Very little    3 = Somewhat    4 = Quite a bit     5 = A lot   To what extent has the treatment improved your symptoms? NA       To what extent has the treatment made your voice clearer? NA       To what extent has the treatment made it easier to talk? NA       To what extent has the treatment made it easier to sing? NA       To what extent are you able to implement the treatment techniques in your daily life? NA       To what extent are the treatment techniques habitual? NA       Since your last session how would you rate the quality of your voice on average? Speaking - 7  Singing - 5       0 - 10 scale in which 10 represents patient s best possible voice and 0 represents no voice at all   Since your last session how would you rate the effort of using your voice on average? Speaking - 3  Singing - 8       0 - 10 scale in which 10 represents maximal effort and 0 represents no effort at all     Clinician perceptual assessment:  VOICE:    Breathiness: Minimal    Roughness: Mild to moderate Intermittent    Strain: Mild Intermittent (moderate with elevated pitch in singing)    Loudness in conversational speech:  WNL    COUGH/AIRWAY:    Occasional    Increased in conjunction with voice use    THERAPEUTIC ACTIVITIES  Today Mr. Javier participated in the following therapeutic activities:    Counseling and education:    Initial evaluation endoscopy video reviewed and compared to normal examination    Asked many questions about the nature of his symptoms, and I answered all of these thoroughly.    Instructed concepts and techniques for optimal vocal hygiene including:    Systemic hydration, including strategies for increasing daily water intake    Topical hydration - Gargling, saline nasal irrigation, humidification, steam, guaifenesin    Environmental  barriers to healthy voicing - noise, inhaled irritants, room acoustics    Awareness and reduction of phonotraumatic behaviors    Moderating voice use    Substituting non-voice alternative behaviors    Avoiding cough and throat clearing    Semi-Occluded Vocal Tract (SOVT) exercises instructed to reduce laryngeal tension, promote vocal fold pliability, and coordinate respiration and phonation    Tongue bubble to target BOT tension during exercises was trialed, but was not facilitating    Straw with water resistance was found to be most facilitating     Sustained phonation, and voice vs. voiceless productions used to promote easy voicing and raise awareness of laryngeal tension    Ascending and descending glides utilized to promote vocal fold pliability    Instructed to use these exercises as a warm-up / cooldown, and to re-calibrate the voice throughout the day.    75% accuracy with minimal clinician support    Incorporation of SOVT within broader warm-up regimen instructed      Concepts of an optimal regimen for practice were instructed.  o He should use an interval schedule of practice, with brief periods of practice frequently throughout each day  o Tripp concepts of volitional practice to facilitate motor learning.    I provided handouts of today's therapeutic activities to facilitate practice.    ASSESSMENT/PLAN  PROGRESS TOWARD LONG TERM GOALS:   Minimal at this point, as this is first session, but good learning today    IMPRESSIONS: Mr. Javier reported good understanding of therapeutic rationale and was able to demonstrate the use of therapeutic exercises with good accuracy and minimal support.  Intermittent increased strain was appreciable with elevated pitch; however the use of forward resonant sounds mitigated this tendency.    PLAN: I will see Mr. Javier in two weeks to work on optimizing speaking voice through the use of RVT /CTT, and reduction of BOT tension in singing.  Goals for the interim are  as follows:    Practice SOVT exercises intermittently throughout the day in short sessions    Maintain vigilence for good vocal hygiene, and optimal patterns of use in daily vocal demands    Saline nasal irrigation 1-2 times daily      PRIMARY ICD-10 code:  R49.0 (Dysphonia)      TOTAL SERVICE TIME: 60 minutes  TREATMENT (80215): 60 minutes  NO CHARGE FACILITY FEE (68597)    Gideon Cortes M.M., M.A., CCC-SLP  Speech-Language Pathologist  Certificate of Vocology  [phone]

## 2017-08-21 NOTE — MR AVS SNAPSHOT
After Visit Summary   8/21/2017    Jarad Javier    MRN: [id number]           Patient Information     Date Of Birth          1955        Visit Information        Provider Department      8/21/2017 8:00 AM Joe Cortes SLP [facility]        Today's Diagnoses     Dysphonia    -  1       Follow-ups after your visit        Your next 10 appointments already scheduled     Sep 05, 2017  2:00 PM CDT   (Arrive by 1:45 PM)   [facility] SLEEP [facility] with [facility] ([facility])    [address] [place] [id number] [phone]            Sep 18, 2017  8:00 AM CDT   (Arrive by 7:45 AM)   [facility] SLEEP [facility]ICE with [facility] ([facility])    [address] [place] [phone] [phone]            Oct 09, 2017  8:00 AM CDT   (Arrive by 7:45 AM)   NEW SLEEP [facility]ICE with [facility] ([facility])    [address] [place] [id number] [phone]              Who to contact     Please call your clinic at [phone] to:    Ask questions about your health    Make or cancel appointments    Discuss your medicines    Learn about your test results    Speak to your doctor   If you have compliments or concerns about an experience at your clinic, or if you wish to file a complaint, please contact [facility] Patient Relations at [phone] or email us at [email or web address]         Additional Information About Your Visit        MyChart Information     [employer] gives you secure access to your electronic health record. If you see a primary care provider, you can also send messages to your care team and make appointments. If you have questions, please call your primary care clinic.  If you do not have a primary care provider, please call [phone] and they  will assist you.      [employer] is an electronic gateway that provides easy, online access to your medical records. With [employer], you can request a clinic appointment, read your test results, renew a prescription or communicate with your care team.     To access your existing account, please contact your [facility] or call [phone] for assistance.        Care EveryWhere ID     This is your Care EveryWhere ID. This could be used by other organizations to access your [facility] medical records  [id number]         Blood Pressure from Last 3 Encounters:   No data found for BP    Weight from Last 3 Encounters:   08/07/17 117.9 kg (260 lb)              We Performed the Following     SPEECH/HEARING THERAPY, INDIVIDUAL        Primary Care Provider    Doctor Unknown, MD       No address on file        Equal Access to Services     : Hadii ramiro Collins, waangel juárez, qaybta kaalmada antonia, ethan gould . [facility] [phone].    ATENCIÓN: Si habla español, tiene a ramirez disposición servicios gratuitos de asistencia lingüística. Llame al [phone].    We comply with applicable federal civil rights laws and [place] laws. We do not discriminate on the basis of race, color, national origin, age, disability sex, sexual orientation or gender identity.            Thank you!     Thank you for choosing  [facility]  for your care. Our goal is always to provide you with excellent care. Hearing back from our patients is one way we can continue to improve our services. Please take a few minutes to complete the written survey that you may receive in the mail after your visit with us. Thank you!             Your Updated Medication List - Protect others around you: Learn how to safely use, store and throw away your medicines at www.disposemymeds.org.          This list is accurate as of: 8/21/17  9:23 AM.  Always use your most recent med list.                    Brand Name Dispense Instructions for use Diagnosis    cholecalciferol 5000 UNITS Caps capsule    vitamin D3     Take 5,000 Units by mouth        citalopram 20 MG tablet    celeXA     Take 20 mg by mouth        coenzyme Q-10 10 MG Caps           ibuprofen 800 MG tablet    ADVIL/MOTRIN     Take 800 mg by mouth        lisinopril-hydrochlorothiazide 20-12.5 MG per tablet    PRINZIDE/ZESTORETIC     TAKE ONE TABLET BY MOUTH EVERY DAY        omeprazole 20 MG CR capsule    priLOSEC     Take 20 mg by mouth

## 2017-08-21 NOTE — PROGRESS NOTES
"[facility]  Michoacano Azul Jr., M.D., F.A.C.S.  Liseth Cordova M.D., M.P.H.  Carolina Abraham, Ph.D., CCC/SLP  Deandra Hoffman M.M. (voice), M.A., CCC/SLP  Joe Cortes M.M. (voice), M.A., CCC/SLP    [facility]  THERAPY PROGRESS REPORT    Patient: Jarad Javier  Date of Service: 8/21/2017  Date of Last Service: 8/7/17      I had the pleasure of seeing Mr. Javier for the first post-evaluation therapy session (CPT code 31049).  He was referred by his physician, Dr. Cordova, for medically necessary speech therapy to address a diagnosis of R49.0 (Dysphonia) in the context of an imbalance in function of the intrinsic and extrinsic muscles of the larynx.  His initial evaluation was performed by my colleague Deandra Hoffman CCC-SLP. Please refer to the initial evaluation report on 8/7/17 for complete details regarding diagnostic findings.    SUBJECTIVE:  Since his last sesion, Mr. Javier reports the following:     He states he has not had any change in his voice, which is expected, as no therapeutic suggestions were made at the time.    He states that his primary goal is to improve his confidence in his voice    Harder to hit higher notes    Feels that \"he isn't singing correctly\"    Instability beginning in mid-range    Soft dynamics at all ranges have become more challenging    OBJECTIVE:  Patient reported measures:  Question Session Date    8/21/17       Overall, I did my speech therapy exercises / techniques / strategies: NA       0 = n/a  1 = once or twice since last session  2 = three to five days per week 3 = almost every day  4 = one to two times per day  5 = three or more times per day   Since beginning your treatment, how confident do you feel in your ability to manage your current and future symptoms? NA       1 = Not at all   2 = Very little    3 = Somewhat   4 = Reasonably  5 = Very   Considering the progress since your last appointment, please provide a response to the questions " below based on the following options:   0 = N/A   1= Not at all   2 = Very little    3 = Somewhat    4 = Quite a bit     5 = A lot   To what extent has the treatment improved your symptoms? NA       To what extent has the treatment made your voice clearer? NA       To what extent has the treatment made it easier to talk? NA       To what extent has the treatment made it easier to sing? NA       To what extent are you able to implement the treatment techniques in your daily life? NA       To what extent are the treatment techniques habitual? NA       Since your last session how would you rate the quality of your voice on average? Speaking - 7  Singing - 5       0 - 10 scale in which 10 represents patient s best possible voice and 0 represents no voice at all   Since your last session how would you rate the effort of using your voice on average? Speaking - 3  Singing - 8       0 - 10 scale in which 10 represents maximal effort and 0 represents no effort at all     Clinician perceptual assessment:  VOICE:    Breathiness: Minimal    Roughness: Mild to moderate Intermittent    Strain: Mild Intermittent (moderate with elevated pitch in singing)    Loudness in conversational speech:  WNL    COUGH/AIRWAY:    Occasional    Increased in conjunction with voice use    THERAPEUTIC ACTIVITIES  Today Mr. Javier participated in the following therapeutic activities:    Counseling and education:    Initial evaluation endoscopy video reviewed and compared to normal examination    Asked many questions about the nature of his symptoms, and I answered all of these thoroughly.    Instructed concepts and techniques for optimal vocal hygiene including:    Systemic hydration, including strategies for increasing daily water intake    Topical hydration - Gargling, saline nasal irrigation, humidification, steam, guaifenesin    Environmental barriers to healthy voicing - noise, inhaled irritants, room acoustics    Awareness and reduction of  phonotraumatic behaviors    Moderating voice use    Substituting non-voice alternative behaviors    Avoiding cough and throat clearing    Semi-Occluded Vocal Tract (SOVT) exercises instructed to reduce laryngeal tension, promote vocal fold pliability, and coordinate respiration and phonation    Tongue bubble to target BOT tension during exercises was trialed, but was not facilitating    Straw with water resistance was found to be most facilitating     Sustained phonation, and voice vs. voiceless productions used to promote easy voicing and raise awareness of laryngeal tension    Ascending and descending glides utilized to promote vocal fold pliability    Instructed to use these exercises as a warm-up / cooldown, and to re-calibrate the voice throughout the day.    75% accuracy with minimal clinician support    Incorporation of SOVT within broader warm-up regimen instructed      Concepts of an optimal regimen for practice were instructed.  o He should use an interval schedule of practice, with brief periods of practice frequently throughout each day  o [place] concepts of volitional practice to facilitate motor learning.    I provided handouts of today's therapeutic activities to facilitate practice.    ASSESSMENT/PLAN  PROGRESS TOWARD LONG TERM GOALS:   Minimal at this point, as this is first session, but good learning today    IMPRESSIONS: Mr. Javier reported good understanding of therapeutic rationale and was able to demonstrate the use of therapeutic exercises with good accuracy and minimal support.  Intermittent increased strain was appreciable with elevated pitch; however the use of forward resonant sounds mitigated this tendency.    PLAN: I will see Mr. Javier in two weeks to work on optimizing speaking voice through the use of RVT /CTT, and reduction of BOT tension in singing.  Goals for the interim are as follows:    Practice SOVT exercises intermittently throughout the day in short  sessions    Maintain vigilence for good vocal hygiene, and optimal patterns of use in daily vocal demands    Saline nasal irrigation 1-2 times daily      PRIMARY ICD-10 code:  R49.0 (Dysphonia)      TOTAL SERVICE TIME: 60 minutes  TREATMENT (58640): 60 minutes  NO CHARGE FACILITY FEE (09982)    Gideon Cortes M.M., M.A., CCC-SLP  Speech-Language Pathologist  Certificate of Vocology  [phone]

## 2017-08-23 ENCOUNTER — APPOINTMENT (OUTPATIENT)
Dept: CT IMAGING | Facility: CLINIC | Age: 62
End: 2017-08-23
Attending: EMERGENCY MEDICINE
Payer: COMMERCIAL

## 2017-08-23 ENCOUNTER — HOSPITAL ENCOUNTER (EMERGENCY)
Facility: CLINIC | Age: 62
Discharge: HOME | End: 2017-08-23
Attending: EMERGENCY MEDICINE | Admitting: EMERGENCY MEDICINE
Payer: COMMERCIAL

## 2017-08-23 VITALS
SYSTOLIC BLOOD PRESSURE: 154 MMHG | BODY MASS INDEX: 36.51 KG/M2 | DIASTOLIC BLOOD PRESSURE: 83 MMHG | OXYGEN SATURATION: 97 % | WEIGHT: 255 LBS | HEIGHT: 70 IN | HEART RATE: 85 BPM | TEMPERATURE: 97.9 F | RESPIRATION RATE: 18 BRPM

## 2017-08-23 DIAGNOSIS — N23 RENAL COLIC: ICD-10-CM

## 2017-08-23 DIAGNOSIS — N20.1 URETERAL STONE: ICD-10-CM

## 2017-08-23 LAB
ALBUMIN UR-MCNC: 30 MG/DL
ANION GAP SERPL CALCULATED.3IONS-SCNC: 10 MMOL/L (ref 3–14)
APPEARANCE UR: ABNORMAL
BASOPHILS # BLD AUTO: 0 10E9/L (ref 0–0.2)
BASOPHILS NFR BLD AUTO: 0.3 %
BILIRUB UR QL STRIP: NEGATIVE
BUN SERPL-MCNC: 17 MG/DL (ref 7–30)
CALCIUM SERPL-MCNC: 9.1 MG/DL (ref 8.5–10.1)
CHLORIDE SERPL-SCNC: 108 MMOL/L (ref 94–109)
CO2 SERPL-SCNC: 22 MMOL/L (ref 20–32)
COLOR UR AUTO: ABNORMAL
CREAT SERPL-MCNC: 0.85 MG/DL (ref 0.66–1.25)
DIFFERENTIAL METHOD BLD: ABNORMAL
EOSINOPHIL # BLD AUTO: 0 10E9/L (ref 0–0.7)
EOSINOPHIL NFR BLD AUTO: 0.2 %
ERYTHROCYTE [DISTWIDTH] IN BLOOD BY AUTOMATED COUNT: 13.2 % (ref 10–15)
GFR SERPL CREATININE-BSD FRML MDRD: >90 ML/MIN/1.7M2
GLUCOSE SERPL-MCNC: 137 MG/DL (ref 70–99)
GLUCOSE UR STRIP-MCNC: NEGATIVE MG/DL
HCT VFR BLD AUTO: 49.6 % (ref 40–53)
HGB BLD-MCNC: 17.9 G/DL (ref 13.3–17.7)
HGB UR QL STRIP: ABNORMAL
IMM GRANULOCYTES # BLD: 0 10E9/L (ref 0–0.4)
IMM GRANULOCYTES NFR BLD: 0.4 %
KETONES UR STRIP-MCNC: NEGATIVE MG/DL
LEUKOCYTE ESTERASE UR QL STRIP: NEGATIVE
LYMPHOCYTES # BLD AUTO: 1 10E9/L (ref 0.8–5.3)
LYMPHOCYTES NFR BLD AUTO: 9.3 %
MCH RBC QN AUTO: 31.7 PG (ref 26.5–33)
MCHC RBC AUTO-ENTMCNC: 36.1 G/DL (ref 31.5–36.5)
MCV RBC AUTO: 88 FL (ref 78–100)
MONOCYTES # BLD AUTO: 0.4 10E9/L (ref 0–1.3)
MONOCYTES NFR BLD AUTO: 3.4 %
MUCOUS THREADS #/AREA URNS LPF: PRESENT /LPF
NEUTROPHILS # BLD AUTO: 9.5 10E9/L (ref 1.6–8.3)
NEUTROPHILS NFR BLD AUTO: 86.4 %
NITRATE UR QL: NEGATIVE
NRBC # BLD AUTO: 0 10*3/UL
NRBC BLD AUTO-RTO: 0 /100
PH UR STRIP: 5 PH (ref 5–7)
PLATELET # BLD AUTO: 180 10E9/L (ref 150–450)
POTASSIUM SERPL-SCNC: 4.1 MMOL/L (ref 3.4–5.3)
RBC # BLD AUTO: 5.65 10E12/L (ref 4.4–5.9)
RBC #/AREA URNS AUTO: >182 /HPF (ref 0–2)
SODIUM SERPL-SCNC: 139 MMOL/L (ref 133–144)
SOURCE: ABNORMAL
SP GR UR STRIP: 1.02 (ref 1–1.03)
SQUAMOUS #/AREA URNS AUTO: 1 /HPF (ref 0–1)
UROBILINOGEN UR STRIP-MCNC: NORMAL MG/DL (ref 0–2)
WBC # BLD AUTO: 11 10E9/L (ref 4–11)
WBC #/AREA URNS AUTO: 0 /HPF (ref 0–2)

## 2017-08-23 PROCEDURE — 99284 EMERGENCY DEPT VISIT MOD MDM: CPT | Mod: 25 | Performed by: EMERGENCY MEDICINE

## 2017-08-23 PROCEDURE — 80048 BASIC METABOLIC PNL TOTAL CA: CPT | Performed by: EMERGENCY MEDICINE

## 2017-08-23 PROCEDURE — 96374 THER/PROPH/DIAG INJ IV PUSH: CPT | Performed by: EMERGENCY MEDICINE

## 2017-08-23 PROCEDURE — 74176 CT ABD & PELVIS W/O CONTRAST: CPT

## 2017-08-23 PROCEDURE — 99284 EMERGENCY DEPT VISIT MOD MDM: CPT | Mod: Z6 | Performed by: EMERGENCY MEDICINE

## 2017-08-23 PROCEDURE — 81001 URINALYSIS AUTO W/SCOPE: CPT | Performed by: EMERGENCY MEDICINE

## 2017-08-23 PROCEDURE — 85025 COMPLETE CBC W/AUTO DIFF WBC: CPT | Performed by: EMERGENCY MEDICINE

## 2017-08-23 PROCEDURE — 96375 TX/PRO/DX INJ NEW DRUG ADDON: CPT | Performed by: EMERGENCY MEDICINE

## 2017-08-23 PROCEDURE — 25000128 H RX IP 250 OP 636: Performed by: EMERGENCY MEDICINE

## 2017-08-23 PROCEDURE — 96361 HYDRATE IV INFUSION ADD-ON: CPT | Performed by: EMERGENCY MEDICINE

## 2017-08-23 RX ORDER — KETOROLAC TROMETHAMINE 30 MG/ML
30 INJECTION, SOLUTION INTRAMUSCULAR; INTRAVENOUS ONCE
Status: COMPLETED | OUTPATIENT
Start: 2017-08-23 | End: 2017-08-23

## 2017-08-23 RX ORDER — TAMSULOSIN HYDROCHLORIDE 0.4 MG/1
0.4 CAPSULE ORAL DAILY
Qty: 10 CAPSULE | Refills: 0 | Status: SHIPPED | OUTPATIENT
Start: 2017-08-23 | End: 2017-09-02

## 2017-08-23 RX ORDER — ONDANSETRON 4 MG/1
4 TABLET, ORALLY DISINTEGRATING ORAL EVERY 6 HOURS PRN
Qty: 10 TABLET | Refills: 0 | Status: SHIPPED | OUTPATIENT
Start: 2017-08-23 | End: 2017-08-26

## 2017-08-23 RX ORDER — ONDANSETRON 2 MG/ML
4 INJECTION INTRAMUSCULAR; INTRAVENOUS ONCE
Status: COMPLETED | OUTPATIENT
Start: 2017-08-23 | End: 2017-08-23

## 2017-08-23 RX ORDER — OXYCODONE AND ACETAMINOPHEN 5; 325 MG/1; MG/1
1-2 TABLET ORAL EVERY 6 HOURS PRN
Qty: 20 TABLET | Refills: 0 | Status: SHIPPED | OUTPATIENT
Start: 2017-08-23 | End: 2017-08-28

## 2017-08-23 RX ADMIN — ONDANSETRON 4 MG: 2 INJECTION INTRAMUSCULAR; INTRAVENOUS at 07:54

## 2017-08-23 RX ADMIN — KETOROLAC TROMETHAMINE 30 MG: 30 INJECTION, SOLUTION INTRAMUSCULAR at 07:55

## 2017-08-23 RX ADMIN — SODIUM CHLORIDE 1000 ML: 9 INJECTION, SOLUTION INTRAVENOUS at 07:54

## 2017-08-23 ASSESSMENT — ENCOUNTER SYMPTOMS
VOMITING: 1
CONFUSION: 0
NECK STIFFNESS: 0
HEADACHES: 0
FEVER: 0
ARTHRALGIAS: 0
SHORTNESS OF BREATH: 0
ABDOMINAL PAIN: 0
COLOR CHANGE: 0
BACK PAIN: 1
NAUSEA: 1
EYE REDNESS: 0
DIFFICULTY URINATING: 0

## 2017-08-23 NOTE — ED AVS SNAPSHOT
" [facility], Emergency Department    [address] [address]    Phone:  [phone]                                       Jarad Javier   MRN: [id number]    Department:  [facility], Emergency Department   Date of Visit:  8/23/2017           Patient Information     Date Of Birth          1955        Your diagnoses for this visit were:     Ureteral stone     Renal colic        You were seen by Varghese Laureano MD.        Discharge Instructions          * KIDNEY STONE (w/ Colic)    The sharp cramping pain and nausea/vomiting that you have is due to a small stone which has formed in the kidney and is now passing down a narrow tube (ureter) on its way to your bladder. Once it reaches your bladder, the pain will stop. The stone may pass in your urine stream in one piece. [The size may be 1/16\" to 1/4\" (1-6mm)]. Or, the stone may also break up into phylicia fragments which you may not even notice.  Once you have had a kidney stone there is a risk for recurrence in the future.  HOME CARE:    Drink lots of fluid (at least 8-10 glasses of water a day).    Most stones will pass on their own, but may take from a few hours to a few days.    Each time you urinate, do so in a jar. Pour the urine from the jar through the strainer and into the toilet. Continue doing this until 24 hours after your pain stops. By then, if there was a kidney stone, it should pass from your bladder. Some stones dissolve into sand-like particles and pass right through the strainer. In that case, you won't ever see a stone.    Save any stone that you find in the strainer and bring it to your doctor for analysis. It may be possible to prevent certain types of stones from forming. Therefore, it is important to know what kind of stone you have.    Try to stay as active as possible since this will help the stone pass. Do not stay in bed unless your pain prevents you from getting up. You may notice a red, pink or brown color to " your urine. This is normal while passing a kidney stone.  FOLLOW UP with your doctor or return to this facility if the pain lasts more than 48 hours.  GET PROMPT MEDICAL ATTENTION if any of the following occur:    Pain that is not controlled by the medicine given    Repeated vomiting or unable to keep down fluids    Weakness, dizziness or fainting    Fever over 101  F (38.3  C)    Passage of solid red or brown urine (can't see through it) or urine with lots of blood clots    Unable to pass urine for 8 hours and increasing bladder pressure    2861-1021 [facility], [address] [place] [address]. All rights reserved. This information is not intended as a substitute for professional medical care. Always follow your healthcare professional's instructions.      Kidney Stone, Undescended, No Symptoms    A kidney stone (nephrolithiasis) begins as tiny crystals that form inside the kidney where urine is made. Most kidney stones enlarge to about 1/8 to 1/4 inch in size before leaving the kidney and moving toward the bladder. There are 4 types of kidney stones. Eighty percent are calcium stones--mostly calcium oxalate but also some with calcium phosphate. The other 3 types include uric acid stones, struvite stones (from a preceding infection), and rarely, cystine stones.  When the stone breaks free and begins to move down the ureter (the narrow tube joining the kidney to the bladder) it often causes sharp back and side pain, often with nausea and vomiting. When the stone reaches the bladder, the pain stops. Once in your bladder, the kidney stone may pass through the urethra (urinary opening) while you are urinating (which may cause pain to start again). Or, it may break into such small fragments that you don t notice it passing.  Your kidney stone is still inside the kidney. There is no way to predict how long it will be before it breaks free and causes any symptoms. Most stones will pass on their own within a  few hours to a few days (sometimes longer). You may notice a red, pink, or brown color to your urine. This is normal while passing a kidney stone. A large stone may not pass on its own and may require special procedures to remove it. These procedures include lithotripsy, which uses ultrasound waves to break up the stone; ureteroscopy, which pushes a thin, basket-like instrument through the urethra and bladder and into the ureter to pull out the stone; and various types of direct surgery through the skin.  Home care  The following guidelines will help you care for yourself at home:    Drink plenty of fluids. This increases urine flow and reduces the risk of further stone formation. Healthy adults (no heart/liver/kidney disease) who have had a kidney stone should drink 12, 8-ounce glasses of fluids per day. Most of this should be water. The goal is to produce 1.5 to 2 quarts of almost colorless urine per 24 hours.    You should collect your urine in a container and then drain it through a strainer to collect any stones or pieces of stones. Take these to your healthcare provider to help identify your specific type of stone to aid in future treatment and dietary changes.    Try to stay as active as possible since this will help the stone pass. Don't stay in bed unless you have pain that prevents you from getting up.    If you develop pain, you may take ibuprofen or naproxen for pain, unless another medicine was prescribed. If you have chronic liver or kidney disease or ever had a stomach ulcer or GI bleeding, talk with your healthcare provider before using these medicines.  Prevention  Each year, there is a 5% to 10% chance that a new stone will form (50% chance over the next 5 to 7 years). The risk is higher if you have a family history of kidney stones or have certain chronic illnesses such as hypertension, obesity, or diabetes. However, there are lifestyle and dietary changes that you can make to reduce the risk of a  recurrence.  Most kidney stones are made of calcium. The following is advice for preventing a recurrence of calcium stones.  If you don t know the type of stone you have, follow this advice until the cause of your stone is determined.  Things that help:    The most important thing you can do is to drink plenty of fluids each day, as described above.     Certain foods, such as wheat, rice, rye, barley and beans, contain phytate, a compound that may lower the risk of recurrence of any type of stone.    Eat more fruits and vegetables (especially those high in potassium).    Eat foods high in natural citrate like fruit and fruit juices (using low sugar).    Low calcium contributes to the formation of calcium type kidney stones. Eat a normal calcium diet and speak with your doctor if you are taking calcium supplements. It may be detrimental to reduce your calcium intake. New research shows that eating calcium-rich and oxalate-rich foods together lowers your risk of stones by binding the minerals in the stomach and intestines before they can reach the kidneys.    Limit salt intake to 2 grams (1 teaspoon) per day. Use limited amounts when cooking, and don t add salt at the table. Processed and canned foods are usually high in salt.    Spinach, rhubarb, peanuts, cashews and almonds, grapefruit and grapefruit juice are all high oxalate foods and should be reduced, or eaten with calcium rich foods. These foods include dairy, dark leafy greens, soy products, and calcium enriched foods.    Reducing the amount of animal meat in your diet may lower your risk of uric acid stones.    Avoid excess sugar (sucrose) and fructose (sweetener in many soft drinks) in your diet.     If you take vitamin C as a supplement, do not take more than 1,000 milligrams (mg) per day.    A dietitian or your healthcare provider can provide you with specific details about dietary changes to prevent kidney stone recurrence.  Follow-up care  Follow up with  your healthcare provider, or as advised. Talk with your healthcare provider about urine and blood tests to find out the cause of your stone.  If you had an X-ray, CT scan, or other diagnostic test, you will be notified of any findings that may affect your care.  Call 911  Call 911 if you have any of these:    Weakness, dizziness, or fainting  When to seek medical advice  Call your healthcare provider right away if any of the these occur:    Severe sharp back or side pain    Repeated vomiting or unable to keep down fluids    Fever of 100.4 F (38 C) or higher, or as directed by your health care provider    Blood (pink or red color) in your urine    Foul smelling or cloudy urine    Unable to pass urine for 8 hours or increasing bladder pressure  Date Last Reviewed: 10/1/2016    6551-5888 [employer]. [address] [place] [address]. All rights reserved. This information is not intended as a substitute for professional medical care. Always follow your healthcare professional's instructions.          Treating Kidney Stones: Expectant Therapy  Most kidney stones are about the size of a grape seed. Stones of this size are small enough to pass naturally. Once it is passed, a stone can be analyzed. This wait-and-see approach is called expectant therapy. Small stones can often be passed with expectant therapy. If pain is a problem, ask your healthcare provider about pain medicines. Then follow his or her directions on how much water to drink. Drinking more water creates more urine to flush out your stone. Also be sure to strain your urine. Take any stones you pass to your provider for analysis.    Drink lots of water  Drinking lots of water may help your stone pass. Water also dilutes the chemicals in your urine. This reduces your risk of forming new stones. You may be told to drink 8, 12-ounce glasses of water a day. Avoid liquids that dehydrate you, such as those containing caffeine or  alcohol.  Strain your urine  Straining your urine lets you collect your stone for analysis. Use the strainer each time you urinate. Strain your urine for as long as your healthcare provider suggests. Watch for brown, tan, gold, or black specks or tiny jennifer. These may be kidney stones.  Take your medicine  Your healthcare provider may give you medicine that makes it more likely for you to pass the kidney stone.   Follow up with your healthcare provider  Follow up by taking any stones you find to your provider for analysis. The type of stone you have determines your diet and prevention program. You may need more tests in the future. These tests will ensure that new stones are not forming.  Date Last Reviewed: 1/1/2017 2000-2017 [employer]. [address] [place] [address], [place] [address]. All rights reserved. This information is not intended as a substitute for professional medical care. Always follow your healthcare professional's instructions.      Please make an appointment to follow up with [Your Primary Care Provider and Urology Clinic (phone: [phone])] [in 10-20 days] [ even if entirely better.]      Future Appointments        Provider Department Dept Phone Center    9/5/2017 2:00 PM [facility] [phone] [facility]    9/18/2017 8:00 AM [facility] [phone] [facility]    10/9/2017 8:00 AM [facility] [phone] [facility]      24 Hour Appointment Hotline       To make an appointment at any [facility], call [phone] ([phone]). If you don't have a family doctor or clinic, we will help you find one. [facility] clinics are conveniently located to serve the needs of you and your family.             Review of your medicines      START taking        Dose / Directions Last dose taken    ondansetron 4 MG ODT tab   Commonly known as:  ZOFRAN ODT   Dose:  4 mg   Quantity:  10 tablet        Take 1 tablet (4 mg) by mouth every  6 hours as needed for nausea   Refills:  0        oxyCODONE-acetaminophen 5-325 MG per tablet   Commonly known as:  PERCOCET   Dose:  1-2 tablet   Quantity:  20 tablet        Take 1-2 tablets by mouth every 6 hours as needed for moderate to severe pain   Refills:  0        tamsulosin 0.4 MG capsule   Commonly known as:  FLOMAX   Dose:  0.4 mg   Quantity:  10 capsule        Take 1 capsule (0.4 mg) by mouth daily for 10 doses   Refills:  0          Our records show that you are taking the medicines listed below. If these are incorrect, please call your family doctor or clinic.        Dose / Directions Last dose taken    cholecalciferol 5000 UNITS Caps capsule   Commonly known as:  vitamin D3   Dose:  5000 Units        Take 5,000 Units by mouth   Refills:  0        citalopram 20 MG tablet   Commonly known as:  celeXA   Dose:  20 mg        Take 20 mg by mouth   Refills:  0        coenzyme Q-10 10 MG Caps        Refills:  0        ibuprofen 800 MG tablet   Commonly known as:  ADVIL/MOTRIN   Dose:  800 mg        Take 800 mg by mouth   Refills:  0        lisinopril-hydrochlorothiazide 20-12.5 MG per tablet   Commonly known as:  PRINZIDE/ZESTORETIC        TAKE ONE TABLET BY MOUTH EVERY DAY   Refills:  0        omeprazole 20 MG CR capsule   Commonly known as:  priLOSEC   Dose:  20 mg        Take 20 mg by mouth   Refills:  0                Prescriptions were sent or printed at these locations (3 Prescriptions)                   Other Prescriptions                Printed at Department/Unit printer (3 of 3)         ondansetron (ZOFRAN ODT) 4 MG ODT tab               oxyCODONE-acetaminophen (PERCOCET) 5-325 MG per tablet               tamsulosin (FLOMAX) 0.4 MG capsule                Procedures and tests performed during your visit     Abd/pelvis CT no contrast - Stone Protocol    Basic metabolic panel    CBC with platelets differential    Peripheral IV catheter    Strain urine    UA reflex to Microscopic      Orders Needing  Specimen Collection     None      Pending Results     No orders found from 8/21/2017 to 8/24/2017.            Pending Culture Results     No orders found from 8/21/2017 to 8/24/2017.            Pending Results Instructions     If you had any lab results that were not finalized at the time of your Discharge, you can call the ED Lab Result RN at [phone]. You will be contacted by this team for any positive Lab results or changes in treatment. The nurses are available 7 days a week from 10A to 6:30P.  You can leave a message 24 hours per day and they will return your call.        Thank you for choosing [facility]       Thank you for choosing [facility] for your care. Our goal is always to provide you with excellent care. Hearing back from our patients is one way we can continue to improve our services. Please take a few minutes to complete the written survey that you may receive in the mail after you visit with us. Thank you!        [employer] Information     [employer] gives you secure access to your electronic health record. If you see a primary care provider, you can also send messages to your care team and make appointments. If you have questions, please call your primary care clinic.  If you do not have a primary care provider, please call [phone] and they will assist you.        Care EveryWhere ID     This is your Care EveryWhere ID. This could be used by other organizations to access your [facility] medical records  [id number]        Equal Access to Services     ASHLEY DIXON : Natividad Collins, waaxda luqadaha, qaybta kaalmapatricia knight, ethan malagon. So [facility] [phone].    ATENCIÓN: Si habla español, tiene a ramirez disposición servicios gratuitos de asistencia lingüística. Llame al [phone].    We comply with applicable federal civil rights laws and [place] laws. We do not discriminate on the basis of race, color, national origin, age, disability sex, sexual  orientation or gender identity.            After Visit Summary       This is your record. Keep this with you and show to your community pharmacist(s) and doctor(s) at your next visit.

## 2017-08-23 NOTE — ED PROVIDER NOTES
"  History     Chief Complaint   Patient presents with     Nausea & Vomiting     Flank Pain     HPI  Jarad Javier is a 61 year old male with a history of hypertension, anxiety, arthritis, GERD and dysphonia who presents for evaluation of left flank pain, nausea and vomiting.  Patient states that he was asleep earlier this morning when he was awakened at 3 AM with sharp left flank pain.  Shortly thereafter she had nausea with an episode of vomiting.  He states that the pain is now dull and aching.  It is not improved or worsened by any type of maneuvers.  It radiates towards his left side and left lower quadrant where it is described as a tightness in this region.  He states that he has never had this pain before.  He denies a history of renal colic or have any family history of the same.  He denies any in all other symptoms.  He states that he had moderately sized bowel movement which did not affect the pain.  I have reviewed the Medications, Allergies, Past Medical and Surgical History, and Social History in the Epic system.    Review of Systems   Constitutional: Negative for fever.   HENT: Negative for congestion.    Eyes: Negative for redness.   Respiratory: Negative for shortness of breath.    Cardiovascular: Negative for chest pain.   Gastrointestinal: Positive for nausea and vomiting. Negative for abdominal pain.   Genitourinary: Negative for difficulty urinating.   Musculoskeletal: Positive for back pain (left flank). Negative for arthralgias and neck stiffness.   Skin: Negative for color change.   Neurological: Negative for headaches.   Psychiatric/Behavioral: Negative for confusion.       Physical Exam   BP: (!) 193/103  Pulse: 85  Temp: 97.9  F (36.6  C)  Resp: 18  Height: 177.8 cm (5' 10\")  Weight: 115.7 kg (255 lb)  SpO2: 97 %  Physical Exam   Constitutional: He appears distressed.   HENT:   Head: Atraumatic.   Mouth/Throat: Oropharynx is clear and moist. No oropharyngeal exudate.   Eyes: Pupils " are equal, round, and reactive to light. No scleral icterus.   Cardiovascular: Normal heart sounds and intact distal pulses.    Pulmonary/Chest: Breath sounds normal. No respiratory distress.   Abdominal: Soft. Bowel sounds are normal. There is no tenderness.   Musculoskeletal: He exhibits no edema or tenderness.   Skin: Skin is warm. No rash noted. He is not diaphoretic.       ED Course     ED Course     Procedures             Labs Ordered and Resulted from Time of ED Arrival Up to the Time of Departure from the ED   CBC WITH PLATELETS DIFFERENTIAL - Abnormal; Notable for the following:        Result Value    Hemoglobin 17.9 (*)     Absolute Neutrophil 9.5 (*)     All other components within normal limits   BASIC METABOLIC PANEL - Abnormal; Notable for the following:     Glucose 137 (*)     All other components within normal limits   URINE MACROSCOPIC WITH REFLEX TO MICRO - Abnormal; Notable for the following:     Blood Urine Large (*)     Protein Albumin Urine 30 (*)     RBC Urine >182 (*)     Mucous Urine Present (*)     All other components within normal limits   PERIPHERAL IV CATHETER   STRAIN URINE            Assessments & Plan (with Medical Decision Making)   61-year-old male presents for evaluation of left flank pain, vomiting nausea.  Differential included renal colic, bowel obstruction, dissection, pyelonephritis.  Exam revealed the patient to be in moderate distress with elevated blood pressure in the setting of hypertension.  Laboratories were obtained including a CBC that revealed slightly elevated hemoglobin consistent with volume concentration and a basic metabolic panel with elevated glucose consistent with stress response.  Urinalysis revealed greater than 182 red blood cells per high-power field.  Abdomen/pelvis CT revealed 2 mm calculus in the mid to distal left ureter.  Patient was treated with IV normal saline, IV Zofran and Toradol with excellent relief and near complete resolution of his  symptoms.  We discussed expectant management and use of medications including ibuprofen, Percocet, Zofran and Flomax.  He she will be discharged to follow-up with urology as well as primary physician.  He was asked to collect and strain his urine and bring a passed stone into clinic for analysis.  I have reviewed the nursing notes.    I have reviewed the findings, diagnosis, plan and need for follow up with the patient.    New Prescriptions    ONDANSETRON (ZOFRAN ODT) 4 MG ODT TAB    Take 1 tablet (4 mg) by mouth every 6 hours as needed for nausea    OXYCODONE-ACETAMINOPHEN (PERCOCET) 5-325 MG PER TABLET    Take 1-2 tablets by mouth every 6 hours as needed for moderate to severe pain    TAMSULOSIN (FLOMAX) 0.4 MG CAPSULE    Take 1 capsule (0.4 mg) by mouth daily for 10 doses       Final diagnoses:   Ureteral stone   Renal colic       8/23/2017   [facility], [place], EMERGENCY DEPARTMENT     Varghese Laureano MD  08/23/17 0954

## 2017-08-23 NOTE — DISCHARGE INSTRUCTIONS
"   * KIDNEY STONE (w/ Colic)    The sharp cramping pain and nausea/vomiting that you have is due to a small stone which has formed in the kidney and is now passing down a narrow tube (ureter) on its way to your bladder. Once it reaches your bladder, the pain will stop. The stone may pass in your urine stream in one piece. [The size may be 1/16\" to 1/4\" (1-6mm)]. Or, the stone may also break up into phylicia fragments which you may not even notice.  Once you have had a kidney stone there is a risk for recurrence in the future.  HOME CARE:    Drink lots of fluid (at least 8-10 glasses of water a day).    Most stones will pass on their own, but may take from a few hours to a few days.    Each time you urinate, do so in a jar. Pour the urine from the jar through the strainer and into the toilet. Continue doing this until 24 hours after your pain stops. By then, if there was a kidney stone, it should pass from your bladder. Some stones dissolve into sand-like particles and pass right through the strainer. In that case, you won't ever see a stone.    Save any stone that you find in the strainer and bring it to your doctor for analysis. It may be possible to prevent certain types of stones from forming. Therefore, it is important to know what kind of stone you have.    Try to stay as active as possible since this will help the stone pass. Do not stay in bed unless your pain prevents you from getting up. You may notice a red, pink or brown color to your urine. This is normal while passing a kidney stone.  FOLLOW UP with your doctor or return to this facility if the pain lasts more than 48 hours.  GET PROMPT MEDICAL ATTENTION if any of the following occur:    Pain that is not controlled by the medicine given    Repeated vomiting or unable to keep down fluids    Weakness, dizziness or fainting    Fever over 101  F (38.3  C)    Passage of solid red or brown urine (can't see through it) or urine with lots of blood clots    Unable " to pass urine for 8 hours and increasing bladder pressure    4239-7098 Mia Castro, [address] [facility], [place] [address]. All rights reserved. This information is not intended as a substitute for professional medical care. Always follow your healthcare professional's instructions.      Kidney Stone, Undescended, No Symptoms    A kidney stone (nephrolithiasis) begins as tiny crystals that form inside the kidney where urine is made. Most kidney stones enlarge to about 1/8 to 1/4 inch in size before leaving the kidney and moving toward the bladder. There are 4 types of kidney stones. Eighty percent are calcium stones--mostly calcium oxalate but also some with calcium phosphate. The other 3 types include uric acid stones, struvite stones (from a preceding infection), and rarely, cystine stones.  When the stone breaks free and begins to move down the ureter (the narrow tube joining the kidney to the bladder) it often causes sharp back and side pain, often with nausea and vomiting. When the stone reaches the bladder, the pain stops. Once in your bladder, the kidney stone may pass through the urethra (urinary opening) while you are urinating (which may cause pain to start again). Or, it may break into such small fragments that you don t notice it passing.  Your kidney stone is still inside the kidney. There is no way to predict how long it will be before it breaks free and causes any symptoms. Most stones will pass on their own within a few hours to a few days (sometimes longer). You may notice a red, pink, or brown color to your urine. This is normal while passing a kidney stone. A large stone may not pass on its own and may require special procedures to remove it. These procedures include lithotripsy, which uses ultrasound waves to break up the stone; ureteroscopy, which pushes a thin, basket-like instrument through the urethra and bladder and into the ureter to pull out the stone; and various types of direct  surgery through the skin.  Home care  The following guidelines will help you care for yourself at home:    Drink plenty of fluids. This increases urine flow and reduces the risk of further stone formation. Healthy adults (no heart/liver/kidney disease) who have had a kidney stone should drink 12, 8-ounce glasses of fluids per day. Most of this should be water. The goal is to produce 1.5 to 2 quarts of almost colorless urine per 24 hours.    You should collect your urine in a container and then drain it through a strainer to collect any stones or pieces of stones. Take these to your healthcare provider to help identify your specific type of stone to aid in future treatment and dietary changes.    Try to stay as active as possible since this will help the stone pass. Don't stay in bed unless you have pain that prevents you from getting up.    If you develop pain, you may take ibuprofen or naproxen for pain, unless another medicine was prescribed. If you have chronic liver or kidney disease or ever had a stomach ulcer or GI bleeding, talk with your healthcare provider before using these medicines.  Prevention  Each year, there is a 5% to 10% chance that a new stone will form (50% chance over the next 5 to 7 years). The risk is higher if you have a family history of kidney stones or have certain chronic illnesses such as hypertension, obesity, or diabetes. However, there are lifestyle and dietary changes that you can make to reduce the risk of a recurrence.  Most kidney stones are made of calcium. The following is advice for preventing a recurrence of calcium stones.  If you don t know the type of stone you have, follow this advice until the cause of your stone is determined.  Things that help:    The most important thing you can do is to drink plenty of fluids each day, as described above.     Certain foods, such as wheat, rice, rye, barley and beans, contain phytate, a compound that may lower the risk of recurrence of  any type of stone.    Eat more fruits and vegetables (especially those high in potassium).    Eat foods high in natural citrate like fruit and fruit juices (using low sugar).    Low calcium contributes to the formation of calcium type kidney stones. Eat a normal calcium diet and speak with your doctor if you are taking calcium supplements. It may be detrimental to reduce your calcium intake. New research shows that eating calcium-rich and oxalate-rich foods together lowers your risk of stones by binding the minerals in the stomach and intestines before they can reach the kidneys.    Limit salt intake to 2 grams (1 teaspoon) per day. Use limited amounts when cooking, and don t add salt at the table. Processed and canned foods are usually high in salt.    Spinach, rhubarb, peanuts, cashews and almonds, grapefruit and grapefruit juice are all high oxalate foods and should be reduced, or eaten with calcium rich foods. These foods include dairy, dark leafy greens, soy products, and calcium enriched foods.    Reducing the amount of animal meat in your diet may lower your risk of uric acid stones.    Avoid excess sugar (sucrose) and fructose (sweetener in many soft drinks) in your diet.     If you take vitamin C as a supplement, do not take more than 1,000 milligrams (mg) per day.    A dietitian or your healthcare provider can provide you with specific details about dietary changes to prevent kidney stone recurrence.  Follow-up care  Follow up with your healthcare provider, or as advised. Talk with your healthcare provider about urine and blood tests to find out the cause of your stone.  If you had an X-ray, CT scan, or other diagnostic test, you will be notified of any findings that may affect your care.  Call 911  Call 911 if you have any of these:    Weakness, dizziness, or fainting  When to seek medical advice  Call your healthcare provider right away if any of the these occur:    Severe sharp back or side  pain    Repeated vomiting or unable to keep down fluids    Fever of 100.4 F (38 C) or higher, or as directed by your health care provider    Blood (pink or red color) in your urine    Foul smelling or cloudy urine    Unable to pass urine for 8 hours or increasing bladder pressure  Date Last Reviewed: 10/1/2016    [id number] [employer]. [address] [place] [address], [place] [address]. All rights reserved. This information is not intended as a substitute for professional medical care. Always follow your healthcare professional's instructions.          Treating Kidney Stones: Expectant Therapy  Most kidney stones are about the size of a grape seed. Stones of this size are small enough to pass naturally. Once it is passed, a stone can be analyzed. This wait-and-see approach is called expectant therapy. Small stones can often be passed with expectant therapy. If pain is a problem, ask your healthcare provider about pain medicines. Then follow his or her directions on how much water to drink. Drinking more water creates more urine to flush out your stone. Also be sure to strain your urine. Take any stones you pass to your provider for analysis.    Drink lots of water  Drinking lots of water may help your stone pass. Water also dilutes the chemicals in your urine. This reduces your risk of forming new stones. You may be told to drink 8, 12-ounce glasses of water a day. Avoid liquids that dehydrate you, such as those containing caffeine or alcohol.  Strain your urine  Straining your urine lets you collect your stone for analysis. Use the strainer each time you urinate. Strain your urine for as long as your healthcare provider suggests. Watch for brown, tan, gold, or black specks or tiny jennifer. These may be kidney stones.  Take your medicine  Your healthcare provider may give you medicine that makes it more likely for you to pass the kidney stone.   Follow up with your healthcare provider  Follow up by taking any  stones you find to your provider for analysis. The type of stone you have determines your diet and prevention program. You may need more tests in the future. These tests will ensure that new stones are not forming.  Date Last Reviewed: 1/1/2017 2000-2017 [employer]. [address] [place] [address]. All rights reserved. This information is not intended as a substitute for professional medical care. Always follow your healthcare professional's instructions.      Please make an appointment to follow up with [Your Primary Care Provider and Urology Clinic (phone: [phone])] [in 10-20 days] [ even if entirely better.]

## 2017-08-23 NOTE — ED AVS SNAPSHOT
[facility], [place], Emergency Department    [address] [facility] [address]    Phone:  [phone]                                       Jarad Javier   MRN: [id number]    Department:  [facility], Emergency Department   Date of Visit:  8/23/2017           After Visit Summary Signature Page     I have received my discharge instructions, and my questions have been answered. I have discussed any challenges I see with this plan with the nurse or doctor.    ..........................................................................................................................................  Patient/Patient Representative Signature      ..........................................................................................................................................  Patient Representative Print Name and Relationship to Patient    ..................................................               ................................................  Date                                            Time    ..........................................................................................................................................  Reviewed by Signature/Title    ...................................................              ..............................................  Date                                                            Time

## 2017-08-28 ENCOUNTER — OFFICE VISIT (OUTPATIENT)
Dept: UROLOGY | Facility: CLINIC | Age: 62
End: 2017-08-28
Payer: COMMERCIAL

## 2017-08-28 VITALS
SYSTOLIC BLOOD PRESSURE: 148 MMHG | DIASTOLIC BLOOD PRESSURE: 70 MMHG | WEIGHT: 255 LBS | HEIGHT: 70 IN | HEART RATE: 78 BPM | BODY MASS INDEX: 36.51 KG/M2

## 2017-08-28 DIAGNOSIS — N20.1 URETERAL STONE: ICD-10-CM

## 2017-08-28 DIAGNOSIS — N20.1 URETERAL STONE: Primary | ICD-10-CM

## 2017-08-28 LAB
ALBUMIN UR-MCNC: NEGATIVE MG/DL
APPEARANCE UR: CLEAR
BILIRUB UR QL STRIP: NEGATIVE
COLOR UR AUTO: YELLOW
GLUCOSE UR STRIP-MCNC: NEGATIVE MG/DL
HGB UR QL STRIP: NEGATIVE
KETONES UR STRIP-MCNC: NEGATIVE MG/DL
LEUKOCYTE ESTERASE UR QL STRIP: NEGATIVE
NITRATE UR QL: NEGATIVE
PH UR STRIP: 6.5 PH (ref 5–7)
SOURCE: NORMAL
SP GR UR STRIP: 1.02 (ref 1–1.03)
UROBILINOGEN UR STRIP-ACNC: 0.2 EU/DL (ref 0.2–1)

## 2017-08-28 PROCEDURE — 81003 URINALYSIS AUTO W/O SCOPE: CPT | Performed by: PHYSICIAN ASSISTANT

## 2017-08-28 PROCEDURE — 99202 OFFICE O/P NEW SF 15 MIN: CPT | Performed by: PHYSICIAN ASSISTANT

## 2017-08-28 PROCEDURE — 99000 SPECIMEN HANDLING OFFICE-LAB: CPT | Performed by: PHYSICIAN ASSISTANT

## 2017-08-28 PROCEDURE — 82365 CALCULUS SPECTROSCOPY: CPT | Mod: 90 | Performed by: PHYSICIAN ASSISTANT

## 2017-08-28 ASSESSMENT — ENCOUNTER SYMPTOMS
DIFFICULTY URINATING: 0
DYSURIA: 0
HEMATURIA: 1
FLANK PAIN: 1

## 2017-08-28 ASSESSMENT — PAIN SCALES - GENERAL: PAINLEVEL: NO PAIN (0)

## 2017-08-28 NOTE — MR AVS SNAPSHOT
After Visit Summary   8/28/2017    Jarad Javier    MRN: [id number]           Patient Information     Date Of Birth          1955        Visit Information        Provider Department      8/28/2017 3:00 PM Nisa Reyes PA-C [facility] [place]        Today's Diagnoses     Ureteral stone          Care Instructions    Standard recommendations on kidney stone prevention:  -These include maintaining fluid intake of 3 liters per day or more with a goal of making 2 or more liters of urine per day.  If alcoholic or caffeinated beverages are consumed then you need to drink water along with these beverages to maintain hydration.    -A few ounces of lemon juice concentrate a day diluted in water can help prevent stones.  -Limit intake of red meat, salt, and salty processed foods.    -Maintain calcium intake in diet through continued consumption of dairy products etc.    -Limit foods that are high in oxalate such as spinach, sweet potatoes, dark chocolate, soy products, and some nuts such as peanuts.   -Additional/different recommendations pending any stone analysis.            Follow-ups after your visit        Your next 10 appointments already scheduled     Sep 05, 2017  2:00 PM CDT   (Arrive by 1:45 PM)   NEW SLEEP VOICE with AAKASH Martinez [employer] ([place] [facility])    [address] [place] [address] [phone]            Sep 18, 2017  8:00 AM CDT   (Arrive by 7:45 AM)   NEW SLEEP VOICE with AAKASH [facility] ([place] [facility])    [address] [id number] [phone]            Oct 09, 2017  8:00 AM CDT   (Arrive by 7:45 AM)   NEW SLEEP VOICE with AAKASH [facility] ([place] [facility])    [address] [address] [phone]              Future tests that  "were ordered for you today     Open Future Orders        Priority Expected Expires Ordered    XR KUB [[id number]] Routine 8/28/2018 8/28/2018 8/28/2017            Who to contact     If you have questions or need follow up information about today's clinic visit or your schedule please contact [facility] [place] directly at [phone].  Normal or non-critical lab and imaging results will be communicated to you by [employer], letter or phone within 4 business days after the clinic has received the results. If you do not hear from us within 7 days, please contact the clinic through [employer] or phone. If you have a critical or abnormal lab result, we will notify you by phone as soon as possible.  Submit refill requests through [employer] or call your pharmacy and they will forward the refill request to us. Please allow 3 business days for your refill to be completed.          Additional Information About Your Visit        [employer] Information     [employer] gives you secure access to your electronic health record. If you see a primary care provider, you can also send messages to your care team and make appointments. If you have questions, please call your primary care clinic.  If you do not have a primary care provider, please call [phone] and they will assist you.        Care EveryWhere ID     This is your Care EveryWhere ID. This could be used by other organizations to access your [place] medical records  [id number]        Your Vitals Were     Pulse Height BMI (Body Mass Index)             78 1.778 m (5' 10\") 36.59 kg/m2          Blood Pressure from Last 3 Encounters:   08/28/17 148/70   08/23/17 154/83    Weight from Last 3 Encounters:   08/28/17 115.7 kg (255 lb)   08/23/17 115.7 kg (255 lb)   08/07/17 117.9 kg (260 lb)              We Performed the Following     Stone analysis [[id number]]     UA without Microscopic        Primary Care Provider    Physician No Ref-Primary       No address on " file        Equal Access to Services     ASHLEY DIXON : Hadii ramiro mcdaniels naga Cantuali, wacmda lurafaeljesicaha, qapierre olgajeffreypatricia knight, ethan malagon. So [facility] [phone].    ATENCIÓN: Si habla español, tiene a ramirez disposición servicios gratuitos de asistencia lingüística. Llame al [phone].    We comply with applicable federal civil rights laws and [place] laws. We do not discriminate on the basis of race, color, national origin, age, disability sex, sexual orientation or gender identity.            Thank you!     Thank you for choosing [facility] [place]  for your care. Our goal is always to provide you with excellent care. Hearing back from our patients is one way we can continue to improve our services. Please take a few minutes to complete the written survey that you may receive in the mail after your visit with us. Thank you!             Your Updated Medication List - Protect others around you: Learn how to safely use, store and throw away your medicines at www.disposemymeds.org.          This list is accurate as of: 8/28/17  4:12 PM.  Always use your most recent med list.                   Brand Name Dispense Instructions for use Diagnosis    cholecalciferol 5000 UNITS Caps capsule    vitamin D3     Take 5,000 Units by mouth        citalopram 20 MG tablet    celeXA     Take 20 mg by mouth        coenzyme Q-10 10 MG Caps           ibuprofen 800 MG tablet    ADVIL/MOTRIN     Take 800 mg by mouth        LISINOPRIL PO      Take 10 mg by mouth        omeprazole 20 MG CR capsule    priLOSEC     Take 20 mg by mouth        tamsulosin 0.4 MG capsule    FLOMAX    10 capsule    Take 1 capsule (0.4 mg) by mouth daily for 10 doses

## 2017-08-28 NOTE — LETTER
8/28/2017       RE: Jarad Javier  [address]  [facility] [address]     Dear Colleague,    Thank you for referring your patient, Jarad Javier, to the [facility] [place] at [facility]. Please see a copy of my visit note below.    CC: Kidney stone.    HPI: It is a pleasure to see . Jarad Javier, a 61 year old male seen today in the urology clinic in consultation from [facility] ED for evaluation of the above. This was first detected on CT in the ED on 8/23/17 after the patient presented complaining of acute renal colic symptoms. The stone measures 2mm and was located in the mid to distal left ureter with associated hydronephrosis. UA in the ED was negative for infection. BMP revealed Cr to be normal. With pain under good control, the patient was discharged with a prescription for tamsulosin and instructions to follow up with urology. He passed the stone later that day.    Currently, the patient reports no pain. The patient has been straining their urine with no captured stones thus far. Denies gross hematuria, dysuria, fevers, chills, N/V. No prior history of kidney stones.    RECENT IMAGING:  EXAMINATION: CT ABDOMEN PELVIS W/O CONTRAST, 8/23/2017 9:13 AM     TECHNIQUE:  Helical CT images from the lung bases through the pubic  symphysis were obtained  without IV contrast.      COMPARISON: None available.     HISTORY: Left flank pain, hematuria, evaluate for ureteral stone     FINDINGS:  There is an approximately 2 mm calculi in the mid to distal left  ureter, series 3, image 325 with minimal upstream ureteral dilatation  and mild hydronephrosis. Mild left perinephric inflammatory stranding  and edema. An additional nonobstructive 3 mm calculi is located in the  superior pole of the left kidney with a 2 mm nonobstructing calculi in  the inferior pole of the right kidney. No right ureteral calculi or  hydroureter. The  bladder is mostly decompressed with mild diffuse wall  thickening.     Complete evaluation is limited secondary to the lack of intravenous  contrast. Hepatic steatosis with mild fatty sparing along the  gallbladder fossa. The gallbladder is partially decompressed with  cholelithiasis. The pancreas, spleen, and adrenal glands are  unremarkable within the confines of a noncontrast study. Small hiatal  hernia. The bowel is without dilatation or adjacent inflammatory  change. Mild mistiness of the mesentery with small non-pathologically  enlarged lymph nodes may be related to sclerosing mesenteritis. No  suspicious lymphadenopathy. No free intraperitoneal air or fluid.     The visualized lung bases demonstrate calcified granulomas and mild  dependent atelectasis. A 3 mm nodule in the superior segment of the  left lower lobe appears noncalcified, series 3, image 10. Degenerative  spondyloarthropathy without acute osseous abnormality or suspicious  bony lesion.       IMPRESSION:   1.  2 mm calculi in the mid to distal left ureter, with minimal  upstream ureteral dilatation, mild hydronephrosis and perinephric  stranding/edema.  2.  Additional tiny nonobstructive renal calculi bilaterally.  3.  Cholelithiasis.  4.  Hepatic steatosis.    Past Medical History:   Diagnosis Date     Anxiety 2013    I take Citalopram     Arthritis 2002    Some in hands but mostly feet, big toe joints     Gastroesophageal reflux disease 2012?    Take antacids when needed     Hypertension 1970's    Borderline high to high, white coat syndrome     Past Surgical History:   Procedure Laterality Date     ADENOIDECTOMY  1960's    When I was about 8     TONSILLECTOMY  1960's    When I was about 8       Current Outpatient Prescriptions   Medication Sig Dispense Refill     LISINOPRIL PO Take 10 mg by mouth       tamsulosin (FLOMAX) 0.4 MG capsule Take 1 capsule (0.4 mg) by mouth daily for 10 doses 10 capsule 0     cholecalciferol (VITAMIN D3) 5000  UNITS CAPS capsule Take 5,000 Units by mouth       citalopram (CELEXA) 20 MG tablet Take 20 mg by mouth       coenzyme Q-10 10 MG CAPS        ibuprofen (ADVIL/MOTRIN) 800 MG tablet Take 800 mg by mouth       omeprazole (PRILOSEC) 20 MG CR capsule Take 20 mg by mouth       No Known Allergies    FAMILY HISTORY: There is no family h/o  malignancy.  There is no family h/o urolithiasis.     SOCIAL HISTORY: The patient does not smoke cigarettes. Denies EtOH and illicit drug abuse.     ROS: A comprehensive 14 point ROS was obtained and otherwise negative except for that outlined above in the HPI.    GENERAL PHYSICAL EXAM:   Vitals: Stable, afebrile, reviewed in EMR  GENERAL: Well groomed, well developed, well nourished male in NAD.  HEAD: Normocephalic. Atraumatic.  RESPIRATORY: No increased respiratory effort.   GI: Soft, NT  MS: Full ROM in extremities, gait normal, normal muscle tone  SKIN: Warm to touch, dry.  NEURO: Alert and oriented x 3.  PSYCH: Normal mood and affect, pleasant and agreeable during interview and exam.    UA today wnl.    ASSESSMENT AND PLAN: 61 year old male with a recently passed stone and small nonobstructing renal stones.    - Can stop tamsulosin.  - Follow up in 12 months with repeat KUB to monitor for stone progression.   - Warning signs discussed including fevers, chills, N/V, gross hematuria, severe pain that is refractory to medications which should prompt more urgent evaluation. Patient understands to proceed to the ER should these warning signs occur outside of clinic hours.    Standard recommendations on kidney stone prevention were provided.    I have enjoyed participating in the medical care of this patient and will continue to keep you updated on his progress.  Please do not hesitate to contact me with any questions or concerns.      20 minutes were spent with the patient today, > 50% in counseling and coordination of care.    Again, thank you for allowing me to participate in the  care of your patient.      Sincerely,    Nisa Reyes PA-C, SEDRICK

## 2017-08-28 NOTE — PATIENT INSTRUCTIONS
Standard recommendations on kidney stone prevention:  -These include maintaining fluid intake of 3 liters per day or more with a goal of making 2 or more liters of urine per day.  If alcoholic or caffeinated beverages are consumed then you need to drink water along with these beverages to maintain hydration.    -A few ounces of lemon juice concentrate a day diluted in water can help prevent stones.  -Limit intake of red meat, salt, and salty processed foods.    -Maintain calcium intake in diet through continued consumption of dairy products etc.    -Limit foods that are high in oxalate such as spinach, sweet potatoes, dark chocolate, soy products, and some nuts such as peanuts.   -Additional/different recommendations pending any stone analysis.

## 2017-08-31 LAB
APPEARANCE STONE: NORMAL
COMPN STONE: NORMAL
NUMBER STONE: 1
SIZE STONE: NORMAL MM
WT STONE: 6 MG

## 2017-09-05 ENCOUNTER — OFFICE VISIT (OUTPATIENT)
Dept: OTOLARYNGOLOGY | Facility: CLINIC | Age: 62
End: 2017-09-05

## 2017-09-05 DIAGNOSIS — R49.0 DYSPHONIA: Primary | ICD-10-CM

## 2017-09-05 NOTE — LETTER
Date:September 6, 2017      Patient was self referred, no letter generated. Do not send.        [facility] Health Information

## 2017-09-05 NOTE — PROGRESS NOTES
[facility] VOICE [facility]  Michoacano Azul Jr., M.D., F.A.C.S.  Liseth Cordova M.D., M.P.H.  Carolina Abraham, Ph.D., CCC/SLP  Deandra Hoffman M.M. (voice), MHA., CCC/SLP  Joe Cortes M.M. (voice), MHA., CCC/SLP    [facility] VOICE [facility]  THERAPY PROGRESS REPORT    Patient: Jarad Javier  Date of Service: 9/5/2017  Date of Last Service: 8/21/17      I had the pleasure of seeing Mr. Javier for the second post-evaluation therapy session (CPT code 19707).  He was referred by his physician, Dr. Cordova, for medically necessary speech therapy to address a diagnosis of R49.0 (Dysphonia) in the context of an imbalance in function of the intrinsic and extrinsic muscles of the larynx.  His initial evaluation was performed by my colleague Deandra Hoffman CCC-SLP. Please refer to the initial evaluation report on 8/7/17 for complete details regarding diagnostic findings.    SUBJECTIVE:  Since his last sesion, Mr. Javier reports the following:     He wasn't able to practice as much as he would have like    He passed a kidney stone and had corresponding vomitting and back pain    Choir starts tomorrow    He has become aware of his throat clearing and is trying to intercede    He has been utilizing saline nasal rinse more regularly and reports that his breathing has gotten easier    His primary complaint at this point is instability in his mid range and vocal fatigue.    OBJECTIVE:  Patient reported measures:  Question Session Date    8/21/17 9/5/17          Overall, I did my speech therapy exercises / techniques / strategies: NA 2         0 = n/a  1 = once or twice since last session  2 = three to five days per week 3 = almost every day  4 = one to two times per day  5 = three or more times per day   Since beginning your treatment, how confident do you feel in your ability to manage your current and future symptoms? NA  4         1 = Not at all   2 = Very little    3 = Somewhat   4 = Reasonably  5 = Very   Considering the  progress since your last appointment, please provide a response to the questions below based on the following options:   0 = N/A   1= Not at all   2 = Very little    3 = Somewhat    4 = Quite a bit     5 = A lot   To what extent has the treatment improved your symptoms? NA  1         To what extent has the treatment made your voice clearer? NA  0         To what extent has the treatment made it easier to talk? NA  0         To what extent has the treatment made it easier to sing? NA  1         To what extent are you able to implement the treatment techniques in your daily life? NA 4         To what extent are the treatment techniques habitual? NA 1         Since your last session how would you rate the quality of your voice on average? Speaking - 7  Singing - 5 Speaking - 8  Singing - 3         0 - 10 scale in which 10 represents patient s best possible voice and 0 represents no voice at all   Since your last session how would you rate the effort of using your voice on average? Speaking - 3  Singing - 8 Speaking - 0  Singing - 8         0 - 10 scale in which 10 represents maximal effort and 0 represents no effort at all     Patient Supplied Answers To Last 2 VHI Questionnaires  Voice Handicap Index (VHI-10) 7/24/2017 9/5/2017   How often do you have any of the following symptoms:  Indigestion, heartburn, chest pain, stomach acid coming up, and/or tasting acid in your mouth or throat? Daily Daily   (F1) My voice makes it difficult for people to hear me. Never Almost never   (F2) People have difficulty understanding me in a noisy room. Almost never Almost never   (F8) My voice difficulties restrict my personal and social life. Never Never   (F9) I feel left out of conversations because of my voice. Never Never   (F10) My voice problem causes me to lose income. Never Never   (P5) I feel as though I have to strain to produce voice. Sometimes Sometimes   (P6) The clarity of my voice is unpredictable. Sometimes Sometimes  "  (E4) My voice problem upsets me. Almost always Sometimes   (E6) My voice makes me feel handicapped. Almost always Almost never   (P3) People ask, \"What's wrong with your voice?\" Never Never   VHI Total Score 11 9     Clinician perceptual assessment:  VOICE:    Breathiness: Mild Consistent    Roughness: Mild to moderate Consistent    Strain: Mild to moderate Intermittent    Loudness in conversational speech:  WNL    COUGH/AIRWAY:    Occasional throat clearing    THERAPEUTIC ACTIVITIES  Today Mr. Javier participated in the following therapeutic activities:    Asked many questions about the nature of his symptoms, and I answered all of these thoroughly.    Semi-Occluded Vocal Tract (SOVT) exercises instructed to reduce laryngeal tension, promote vocal fold pliability, and coordinate respiration and phonation    Demonstrated previously assigned exercises    Tendency to control accuracy of pitch on descending glide    Good consistency of airflow    Graduated ascending glides were most beneficial for reducing strain and tendency to \"control\" his voice.    Exercises to promote reduced tongue base and jaw tension    Simple scalar and arpeggio movements    /jajaja/ or /blablabla/    Physical motion helpful reduction of upper thoracic tension    Tactile biofeedback was facilitative for promoting awareness of excessive jaw engagement    Exercises to promote forward locus of resonance    /nja/ on 5-1 glide was most facilitative    Significant reduction of strain with accurate trials      A revised regimen for home practice was instructed.    I provided an audio recording and handouts of today's therapeutic activities to facilitate practice.    ASSESSMENT/PLAN  PROGRESS TOWARD LONG TERM GOALS:   Adequate progress; please see above    IMPRESSIONS: Good work today.  Forward resonant sounds such as /nja/ were facilitative at reducing strain, improving intonation and stability in the patient's mid-range. Base of tongue and jaw " tension were notable and were able to be reduced through biofeedback and gentle physical motion.  Upper register demonstrated increased breathiness compared to his last visit, and he was encouraged to continue use of SOVT for pliability.    PLAN: I will see Mr. Javier in two weeks at which time we will target forward resonance in running speech, and continued instability in the passagio.   Goals for the interim are as follows:    Practice SOVT exercises intermittently throughout the day in short sessions    Maintain vigilence for good vocal hygiene, and optimal patterns of use in daily vocal demands     Practice jaw and forward resonance exercises 1-2 times daily      PRIMARY ICD-10 code:  R49.0 (Dysphonia)    TOTAL SERVICE TIME: 60 minutes  TREATMENT (20235): 60 minutes  NO CHARGE FACILITY FEE (06099)    Gideon Cortes M.M., M.A., CCC-SLP  Speech-Language Pathologist  Certificate of Vocology  [phone]

## 2017-09-05 NOTE — MR AVS SNAPSHOT
After Visit Summary   9/5/2017    Jarad Javier    MRN: [id number]           Patient Information     Date Of Birth          1955        Visit Information        Provider Department      9/5/2017 2:00 PM Joe Cortes SLP [facility]        Today's Diagnoses     Dysphonia    -  1       Follow-ups after your visit        Your next 10 appointments already scheduled     Sep 18, 2017  8:00 AM CDT   (Arrive by 7:45 AM)   NEW SLEEP VOICE with [facility] ([facility])    [address] [place] [id number] [phone]            Oct 09, 2017  8:00 AM CDT   (Arrive by 7:45 AM)   NEW SLEEP VOICE with [facility] ([facility])    [address] [place] [id number] [phone]              Who to contact     Please call your clinic at [phone] to:    Ask questions about your health    Make or cancel appointments    Discuss your medicines    Learn about your test results    Speak to your doctor   If you have compliments or concerns about an experience at your clinic, or if you wish to file a complaint, please contact [facility] Patient Relations at [phone] or email us at [email or web address]         Additional Information About Your Visit        MyChart Information     [employer] gives you secure access to your electronic health record. If you see a primary care provider, you can also send messages to your care team and make appointments. If you have questions, please call your primary care clinic.  If you do not have a primary care provider, please call [phone] and they will assist you.      [employer] is an electronic gateway that provides easy, online access to your medical records. With [employer], you can request a clinic appointment, read your test results, renew a prescription or communicate with your  care team.     To access your existing account, please contact your [facility] or call [phone] for assistance.        Care EveryWhere ID     This is your Care EveryWhere ID. This could be used by other organizations to access your [facility] medical records  [id number]         Blood Pressure from Last 3 Encounters:   08/28/17 148/70   08/23/17 154/83    Weight from Last 3 Encounters:   08/28/17 115.7 kg (255 lb)   08/23/17 115.7 kg (255 lb)   08/07/17 117.9 kg (260 lb)              We Performed the Following     SPEECH/HEARING THERAPY, INDIVIDUAL        Primary Care Provider    Physician No Ref-Primary       No address on file        Equal Access to Services     ASHLEY DIXON : Natividad Collins, shelia juárez, gustavo knight, ethan malagon. So [facility] [phone].    ATENCIÓN: Si habla español, tiene a ramirez disposición servicios gratuitos de asistencia lingüística. Llame al [phone].    We comply with applicable federal civil rights laws and [place] laws. We do not discriminate on the basis of race, color, national origin, age, disability sex, sexual orientation or gender identity.            Thank you!     Thank you for choosing [facility]  for your care. Our goal is always to provide you with excellent care. Hearing back from our patients is one way we can continue to improve our services. Please take a few minutes to complete the written survey that you may receive in the mail after your visit with us. Thank you!             Your Updated Medication List - Protect others around you: Learn how to safely use, store and throw away your medicines at www.disposemymeds.org.          This list is accurate as of: 9/5/17  3:29 PM.  Always use your most recent med list.                   Brand Name Dispense Instructions for use Diagnosis    cholecalciferol 5000 UNITS Caps capsule    vitamin D3     Take 5,000 Units by mouth         citalopram 20 MG tablet    celeXA     Take 20 mg by mouth        coenzyme Q-10 10 MG Caps           ibuprofen 800 MG tablet    ADVIL/MOTRIN     Take 800 mg by mouth        LISINOPRIL PO      Take 10 mg by mouth        omeprazole 20 MG CR capsule    priLOSEC     Take 20 mg by mouth

## 2017-09-05 NOTE — LETTER
9/5/2017      RE: Jarad Javier  [address]  [facility] [address]       [facility] VOICE [facility]  Michoacano Azul Jr., M.D., F.A.C.S.  Liseth Cordova M.D., M.P.H.  Carolina Abraham, Ph.D., CCC/SLP  Deandra Hoffman M.M. (voice), M.A., CCC/SLP  Joe Cortes M.M. (voice), M.A., CCC/SLP    [facility]  THERAPY PROGRESS REPORT    Patient: Jarad Javier  Date of Service: 9/5/2017  Date of Last Service: 8/21/17      I had the pleasure of seeing Mr. Javier for the second post-evaluation therapy session (CPT code 74768).  He was referred by his physician, Dr. Cordova, for medically necessary speech therapy to address a diagnosis of R49.0 (Dysphonia) in the context of an imbalance in function of the intrinsic and extrinsic muscles of the larynx.  His initial evaluation was performed by my colleague Deandra Hoffman CCC-SLP. Please refer to the initial evaluation report on 8/7/17 for complete details regarding diagnostic findings.    SUBJECTIVE:  Since his last sesion, Mr. Javier reports the following:     He wasn't able to practice as much as he would have like    He passed a kidney stone and had corresponding vomitting and back pain    Choir starts tomorrow    He has become aware of his throat clearing and is trying to intercede    He has been utilizing saline nasal rinse more regularly and reports that his breathing has gotten easier    His primary complaint at this point is instability in his mid range and vocal fatigue.    OBJECTIVE:  Patient reported measures:  Question Session Date    8/21/17 9/5/17          Overall, I did my speech therapy exercises / techniques / strategies: NA 2         0 = n/a  1 = once or twice since last session  2 = three to five days per week 3 = almost every day  4 = one to two times per day  5 = three or more times per day   Since beginning your treatment, how confident do you feel in your ability to manage your current and future symptoms? NA  4         1  = Not at all   2 = Very little    3 = Somewhat   4 = Reasonably  5 = Very   Considering the progress since your last appointment, please provide a response to the questions below based on the following options:   0 = N/A   1= Not at all   2 = Very little    3 = Somewhat    4 = Quite a bit     5 = A lot   To what extent has the treatment improved your symptoms? NA  1         To what extent has the treatment made your voice clearer? NA  0         To what extent has the treatment made it easier to talk? NA  0         To what extent has the treatment made it easier to sing? NA  1         To what extent are you able to implement the treatment techniques in your daily life? NA 4         To what extent are the treatment techniques habitual? NA 1         Since your last session how would you rate the quality of your voice on average? Speaking - 7  Singing - 5 Speaking - 8  Singing - 3         0 - 10 scale in which 10 represents patient s best possible voice and 0 represents no voice at all   Since your last session how would you rate the effort of using your voice on average? Speaking - 3  Singing - 8 Speaking - 0  Singing - 8         0 - 10 scale in which 10 represents maximal effort and 0 represents no effort at all     Patient Supplied Answers To Last 2 VHI Questionnaires  Voice Handicap Index (VHI-10) 7/24/2017 9/5/2017   How often do you have any of the following symptoms:  Indigestion, heartburn, chest pain, stomach acid coming up, and/or tasting acid in your mouth or throat? Daily Daily   (F1) My voice makes it difficult for people to hear me. Never Almost never   (F2) People have difficulty understanding me in a noisy room. Almost never Almost never   (F8) My voice difficulties restrict my personal and social life. Never Never   (F9) I feel left out of conversations because of my voice. Never Never   (F10) My voice problem causes me to lose income. Never Never   (P5) I feel as though I have to strain to produce  "voice. Sometimes Sometimes   (P6) The clarity of my voice is unpredictable. Sometimes Sometimes   (E4) My voice problem upsets me. Almost always Sometimes   (E6) My voice makes me feel handicapped. Almost always Almost never   (P3) People ask, \"What's wrong with your voice?\" Never Never   VHI Total Score 11 9     Clinician perceptual assessment:  VOICE:    Breathiness: Mild Consistent    Roughness: Mild to moderate Consistent    Strain: Mild to moderate Intermittent    Loudness in conversational speech:  WNL    COUGH/AIRWAY:    Occasional throat clearing    THERAPEUTIC ACTIVITIES  Today Mr. Javier participated in the following therapeutic activities:    Asked many questions about the nature of his symptoms, and I answered all of these thoroughly.    Semi-Occluded Vocal Tract (SOVT) exercises instructed to reduce laryngeal tension, promote vocal fold pliability, and coordinate respiration and phonation    Demonstrated previously assigned exercises    Tendency to control accuracy of pitch on descending glide    Good consistency of airflow    Graduated ascending glides were most beneficial for reducing strain and tendency to \"control\" his voice.    Exercises to promote reduced tongue base and jaw tension    Simple scalar and arpeggio movements    /jajaja/ or /blablabla/    Physical motion helpful reduction of upper thoracic tension    Tactile biofeedback was facilitative for promoting awareness of excessive jaw engagement    Exercises to promote forward locus of resonance    /nja/ on 5-1 glide was most facilitative    Significant reduction of strain with accurate trials      A revised regimen for home practice was instructed.    I provided an audio recording and handouts of today's therapeutic activities to facilitate practice.    ASSESSMENT/PLAN  PROGRESS TOWARD LONG TERM GOALS:   Adequate progress; please see above    IMPRESSIONS: Good work today.  Forward resonant sounds such as /nja/ were facilitative at " reducing strain, improving intonation and stability in the patient's mid-range. Base of tongue and jaw tension were notable and were able to be reduced through biofeedback and gentle physical motion.  Upper register demonstrated increased breathiness compared to his last visit, and he was encouraged to continue use of SOVT for pliability.    PLAN: I will see Mr. Javier in two weeks at which time we will target forward resonance in running speech, and continued instability in the passagio.   Goals for the interim are as follows:    Practice SOVT exercises intermittently throughout the day in short sessions    Maintain vigilence for good vocal hygiene, and optimal patterns of use in daily vocal demands     Practice jaw and forward resonance exercises 1-2 times daily      PRIMARY ICD-10 code:  R49.0 (Dysphonia)    TOTAL SERVICE TIME: 60 minutes  TREATMENT (77471): 60 minutes  NO CHARGE FACILITY FEE (53531)    Gideon Cortes M.M., M.A., CCC-SLP  Speech-Language Pathologist  Certificate of Vocology  [phone]    Joe Cortes, SLP

## 2017-09-15 ENCOUNTER — OFFICE VISIT (OUTPATIENT)
Dept: OPTOMETRY | Facility: CLINIC | Age: 62
End: 2017-09-15
Payer: COMMERCIAL

## 2017-09-15 DIAGNOSIS — H01.02A SQUAMOUS BLEPHARITIS OF UPPER AND LOWER EYELIDS OF BOTH EYES: ICD-10-CM

## 2017-09-15 DIAGNOSIS — H02.889 MEIBOMIAN GLAND DYSFUNCTION: ICD-10-CM

## 2017-09-15 DIAGNOSIS — H52.13 MYOPIA, BILATERAL: Primary | ICD-10-CM

## 2017-09-15 DIAGNOSIS — H52.223 REGULAR ASTIGMATISM OF BOTH EYES: ICD-10-CM

## 2017-09-15 DIAGNOSIS — H52.4 PRESBYOPIA: ICD-10-CM

## 2017-09-15 DIAGNOSIS — H01.02B SQUAMOUS BLEPHARITIS OF UPPER AND LOWER EYELIDS OF BOTH EYES: ICD-10-CM

## 2017-09-15 DIAGNOSIS — H25.13 NUCLEAR SCLEROSIS, BILATERAL: ICD-10-CM

## 2017-09-15 PROCEDURE — 92015 DETERMINE REFRACTIVE STATE: CPT | Performed by: OPTOMETRIST

## 2017-09-15 PROCEDURE — 92004 COMPRE OPH EXAM NEW PT 1/>: CPT | Performed by: OPTOMETRIST

## 2017-09-15 ASSESSMENT — VISUAL ACUITY
OD_CC: 20/25
CORRECTION_TYPE: GLASSES
OS_SC: 20/100
OD_CC+: +2
METHOD: SNELLEN - LINEAR
OS_CC: 20/20
OS_CC: 20/20
OD_SC: 20/400
OD_CC: 20/25

## 2017-09-15 ASSESSMENT — REFRACTION_WEARINGRX
OD_AXIS: 155
OS_CYLINDER: +0.25
OS_AXIS: 032
OD_SPHERE: -3.25
SPECS_TYPE: PAL
OS_SPHERE: -1.75
OS_ADD: +2.50
OD_ADD: +2.50
OD_CYLINDER: +0.25

## 2017-09-15 ASSESSMENT — REFRACTION_MANIFEST
OD_AXIS: 025
OD_SPHERE: -3.25
OS_ADD: +2.50
OD_ADD: +2.50
OD_CYLINDER: +0.25
OS_CYLINDER: +0.25
OS_AXIS: 025
OS_SPHERE: -2.00

## 2017-09-15 ASSESSMENT — CONF VISUAL FIELD
OS_NORMAL: 1
METHOD: COUNTING FINGERS
OD_NORMAL: 1

## 2017-09-15 ASSESSMENT — TONOMETRY
IOP_METHOD: TONOPEN
OD_IOP_MMHG: 17
OS_IOP_MMHG: 18

## 2017-09-15 ASSESSMENT — EXTERNAL EXAM - LEFT EYE: OS_EXAM: ROSACEA

## 2017-09-15 ASSESSMENT — CUP TO DISC RATIO
OD_RATIO: 0.15
OS_RATIO: 0.15

## 2017-09-15 ASSESSMENT — EXTERNAL EXAM - RIGHT EYE: OD_EXAM: ROSACEA

## 2017-09-15 NOTE — MR AVS SNAPSHOT
After Visit Summary   9/15/2017    Jarad Javier    MRN: [id number]           Patient Information     Date Of Birth          1955        Visit Information        Provider Department      9/15/2017 1:30 PM Denton Lopez, RAÚL [facility]        Today's Diagnoses     Myopia, bilateral    -  1    Regular astigmatism of both eyes        Presbyopia        Nuclear sclerosis, bilateral        Meibomian gland dysfunction        Squamous blepharitis of upper and lower eyelids of both eyes          Care Instructions    Eyeglass prescription given.    You have the start of mild cataracts.  You may notice some blurred vision or glare with night driving.  It is important that you wear good sunglasses to protect your eyes from the ultraviolet light from the sun.  I recommend that you return in 1 year for an eye exam unless there are any sudden changes in your vision.    You have Demodex blepharitis. Cliradex eyelid scrubs 2 x day for 8 weeks.  These can be purchased online [employer] or at [employer] or [employer] Oust foaming eyelid cleanser.  Systane Balance- 1 drop both eyes 4 x day.    Return in 1 year for a complete eye exam or sooner if needed.    Denton Lopez, RAÚL            Follow-ups after your visit        Your next 10 appointments already scheduled     Sep 18, 2017  8:00 AM CDT   (Arrive by 7:45 AM)   NEW SLEEP VOICE with AAKASH Martinez    [facility] ([facility])    [address] [place] [id number] [phone]            Oct 09, 2017  8:00 AM CDT   (Arrive by 7:45 AM)   NEW SLEEP VOICE with AAKASH Martinez    [facility] ([facility])    [address] [place] [id number] [phone]              Who to contact     If you have questions or need follow up information about today's clinic visit or your schedule please contact [facility] directly at  [phone].  Normal or non-critical lab and imaging results will be communicated to you by 3Scanhart, letter or phone within 4 business days after the clinic has received the results. If you do not hear from us within 7 days, please contact the clinic through [employer] or phone. If you have a critical or abnormal lab result, we will notify you by phone as soon as possible.  Submit refill requests through [employer] or call your pharmacy and they will forward the refill request to us. Please allow 3 business days for your refill to be completed.          Additional Information About Your Visit        3ScanharCollective Intellect Information     [employer] gives you secure access to your electronic health record. If you see a primary care provider, you can also send messages to your care team and make appointments. If you have questions, please call your primary care clinic.  If you do not have a primary care provider, please call [phone] and they will assist you.      [employer] is an electronic gateway that provides easy, online access to your medical records. With [employer], you can request a clinic appointment, read your test results, renew a prescription or communicate with your care team.     To access your existing account, please contact your [facility] or call [phone] for assistance.        Care EveryWhere ID     This is your Care EveryWhere ID. This could be used by other organizations to access your [place] medical records  [id number]         Blood Pressure from Last 3 Encounters:   08/28/17 148/70   08/23/17 154/83    Weight from Last 3 Encounters:   08/28/17 115.7 kg (255 lb)   08/23/17 115.7 kg (255 lb)   08/07/17 117.9 kg (260 lb)              We Performed the Following     EYE EXAM (SIMPLE-NONBILLABLE)     REFRACTION        Primary Care Provider    Physician No Ref-Primary       No address on file        Equal Access to Services     ASHLEY DIXON : shelia Cota  gustavo juárezmapatrciia dennisethan olmedo saumyaalbina malagon. So [facility] [phone].    ATENCIÓN: Si britton argueta, tiene a ramirez disposición servicios gratuitos de asistencia lingüística. Herlindaame al [phone].    We comply with applicable federal civil rights laws and [place] laws. We do not discriminate on the basis of race, color, national origin, age, disability sex, sexual orientation or gender identity.            Thank you!     Thank you for choosing [facility]  for your care. Our goal is always to provide you with excellent care. Hearing back from our patients is one way we can continue to improve our services. Please take a few minutes to complete the written survey that you may receive in the mail after your visit with us. Thank you!             Your Updated Medication List - Protect others around you: Learn how to safely use, store and throw away your medicines at www.disposemymeds.org.          This list is accurate as of: 9/15/17  2:14 PM.  Always use your most recent med list.                   Brand Name Dispense Instructions for use Diagnosis    cholecalciferol 5000 UNITS Caps capsule    vitamin D3     Take 5,000 Units by mouth        citalopram 20 MG tablet    celeXA     Take 20 mg by mouth        coenzyme Q-10 10 MG Caps           ibuprofen 800 MG tablet    ADVIL/MOTRIN     Take 800 mg by mouth        LISINOPRIL PO      Take 10 mg by mouth        omeprazole 20 MG CR capsule    priLOSEC     Take 20 mg by mouth

## 2017-09-15 NOTE — PATIENT INSTRUCTIONS
Eyeglass prescription given.    You have the start of mild cataracts.  You may notice some blurred vision or glare with night driving.  It is important that you wear good sunglasses to protect your eyes from the ultraviolet light from the sun.  I recommend that you return in 1 year for an eye exam unless there are any sudden changes in your vision.    You have Demodex blepharitis. Cliradex eyelid scrubs 2 x day for 8 weeks.  These can be purchased online "[employer] or at [employer] or [employer] foaming eyelid cleanser.  Systane Balance- 1 drop both eyes 4 x day.    Return in 1 year for a complete eye exam or sooner if needed.    Denton Lopez, OD

## 2017-09-15 NOTE — PROGRESS NOTES
Chief Complaint   Patient presents with     COMPREHENSIVE EYE EXAM         Last Eye Exam: 1 year ago   Dilated Previously: Yes    What are you currently using to see?  glasses       Distance Vision Acuity: Satisfied with vision    Near Vision Acuity: Satisfied with vision while reading  with glasses    Eye Comfort: dry- goopy eyes-has tried Ocusoft pads in past- didn't help-   Do you use eye drops? : No  Occupation or Hobbies: retired- School psychologist    Medical, surgical and family histories reviewed and updated 9/15/2017.       OBJECTIVE: See Ophthalmology exam    ASSESSMENT:    ICD-10-CM    1. Myopia, bilateral H52.13 REFRACTION   2. Regular astigmatism of both eyes H52.223 REFRACTION   3. Presbyopia H52.4 REFRACTION   4. Nuclear sclerosis, bilateral H25.13 EYE EXAM (SIMPLE-NONBILLABLE)   5. Meibomian gland dysfunction H02.89 EYE EXAM (SIMPLE-NONBILLABLE)   6. Squamous blepharitis of upper and lower eyelids of both eyes H01.021 EYE EXAM (SIMPLE-NONBILLABLE)    H01.022     H01.024     H01.025       PLAN:     Patient Instructions   Eyeglass prescription given.    You have the start of mild cataracts.  You may notice some blurred vision or glare with night driving.  It is important that you wear good sunglasses to protect your eyes from the ultraviolet light from the sun.  I recommend that you return in 1 year for an eye exam unless there are any sudden changes in your vision.    You have Demodex blepharitis. Cliradex eyelid scrubs 2 x day for 8 weeks.  These can be purchased online [employer] or at [employer] or [employer] Oust foaming eyelid cleanser.  Systane Balance- 1 drop both eyes 4 x day.    Return in 1 year for a complete eye exam or sooner if needed.    Denton Lopez, OD

## 2017-09-18 ENCOUNTER — OFFICE VISIT (OUTPATIENT)
Dept: OTOLARYNGOLOGY | Facility: CLINIC | Age: 62
End: 2017-09-18

## 2017-09-18 DIAGNOSIS — R49.0 DYSPHONIA: Primary | ICD-10-CM

## 2017-09-18 NOTE — MR AVS SNAPSHOT
After Visit Summary   9/18/2017    Jarad Javier    MRN: [id number]           Patient Information     Date Of Birth          1955        Visit Information        Provider Department      9/18/2017 8:00 AM Joe Cortes SLP [facility]        Today's Diagnoses     Dysphonia    -  1       Follow-ups after your visit        Your next 10 appointments already scheduled     Oct 09, 2017  8:00 AM CDT   (Arrive by 7:45 AM)   NEW SLEEP VOICE with AAKASH [facility] ([facility])    [address] [place] [id number] [phone]              Who to contact     Please call your clinic at [phone] to:    Ask questions about your health    Make or cancel appointments    Discuss your medicines    Learn about your test results    Speak to your doctor   If you have compliments or concerns about an experience at your clinic, or if you wish to file a complaint, please contact [facility] Patient Relations at [phone] or email us at [email or web address]         Additional Information About Your Visit        MyChart Information     FAST FELTt gives you secure access to your electronic health record. If you see a primary care provider, you can also send messages to your care team and make appointments. If you have questions, please call your primary care clinic.  If you do not have a primary care provider, please call [phone] and they will assist you.      [employer]ul is an electronic gateway that provides easy, online access to your medical records. With [employer], you can request a clinic appointment, read your test results, renew a prescription or communicate with your care team.     To access your existing account, please contact your [facility] or call [phone] for assistance.        Care EveryWhere ID     This is your Care EveryWhere ID. This could be used by  other organizations to access your [place] medical records  [id number]         Blood Pressure from Last 3 Encounters:   08/28/17 148/70   08/23/17 154/83    Weight from Last 3 Encounters:   08/28/17 115.7 kg (255 lb)   08/23/17 115.7 kg (255 lb)   08/07/17 117.9 kg (260 lb)              We Performed the Following     SPEECH/HEARING THERAPY, INDIVIDUAL        Primary Care Provider    Physician No Ref-Primary       No address on file        Equal Access to Services     ASHLEY DIXON : Hadii aad ku hadasho Soomaali, waaxda luqadaha, qaybta kaalmada adeegyada, waxay idiin hayfroilann brendan raymondlinnettecarla gould . So [facility] [phone].    ATENCIÓN: Si habla español, tiene a ramirez disposición servicios gratuitos de asistencia lingüística. [facility] [phone].    We comply with applicable federal civil rights laws and [place] laws. We do not discriminate on the basis of race, color, national origin, age, disability sex, sexual orientation or gender identity.            Thank you!     Thank you for choosing [facility]  for your care. Our goal is always to provide you with excellent care. Hearing back from our patients is one way we can continue to improve our services. Please take a few minutes to complete the written survey that you may receive in the mail after your visit with us. Thank you!             Your Updated Medication List - Protect others around you: Learn how to safely use, store and throw away your medicines at www.disposemymeds.org.          This list is accurate as of: 9/18/17 10:01 AM.  Always use your most recent med list.                   Brand Name Dispense Instructions for use Diagnosis    cholecalciferol 5000 UNITS Caps capsule    vitamin D3     Take 5,000 Units by mouth        citalopram 20 MG tablet    celeXA     Take 20 mg by mouth        coenzyme Q-10 10 MG Caps           ibuprofen 800 MG tablet    ADVIL/MOTRIN     Take 800 mg by mouth        LISINOPRIL PO      Take 10 mg by mouth        omeprazole 20  MG CR capsule    priLOSEC     Take 20 mg by mouth

## 2017-09-18 NOTE — PROGRESS NOTES
[facility] VOICE [facility]  Michoacano Azul Jr., M.D., F.A.C.S.  Liseth Cordova M.D., M.P.H.  Carolina Abraham, Ph.D., CCC/SLP  Deandra Hoffman M.M. (voice), M.A., CCC/SLP  Joe Cortes M.M. (voice), M.A., CCC/SLP    [facility]  THERAPY PROGRESS REPORT    Patient: Jarad Javier  Date of Service: 9/18/2017  Date of Last Service: 9/5/17      I had the pleasure of seeing Mr. Javier for the third post-evaluation therapy session (CPT code 26097).  He was referred by his physician, Dr. Cordova, for medically necessary speech therapy to address a diagnosis of R49.0 (Dysphonia) in the context of an imbalance in function of the intrinsic and extrinsic muscles of the larynx.  His initial evaluation was performed by my colleague Deandra Hoffman CCC-SLP. Please refer to the initial evaluation report on 8/7/17 for complete details regarding diagnostic findings.    SUBJECTIVE:  Since his last sesion, Mr. Javier reports the following:     Voice is a bit better    Feels awareness of jaw tension is helpful    Forward focus in passaggio is helpful    Reduced fatigue    Feels as though he is still dealing consistent drainage    His primary complaint at this point is consistent sensation and sound of phlegm in the back of his throat.    OBJECTIVE:  Patient reported measures:  Question Session Date    8/21/17 9/5/17 9/18/17         Overall, I did my speech therapy exercises / techniques / strategies: NA 2 2          0 = n/a  1 = once or twice since last session  2 = three to five days per week 3 = almost every day  4 = one to two times per day  5 = three or more times per day   Since beginning your treatment, how confident do you feel in your ability to manage your current and future symptoms? NA  4  3         1 = Not at all   2 = Very little    3 = Somewhat   4 = Reasonably  5 = Very   Considering the progress since your last appointment, please provide a response to the questions below based on the following options:    0 = N/A   1= Not at all   2 = Very little    3 = Somewhat    4 = Quite a bit     5 = A lot   To what extent has the treatment improved your symptoms? NA  1  3         To what extent has the treatment made your voice clearer? NA  0  3         To what extent has the treatment made it easier to talk? NA  0  0         To what extent has the treatment made it easier to sing? NA  1  3         To what extent are you able to implement the treatment techniques in your daily life? NA 4  3         To what extent are the treatment techniques habitual? NA 1  2         Since your last session how would you rate the quality of your voice on average? Speaking - 7  Singing - 5 Speaking - 8  Singing - 3 Speaking - 5  Singing - 4         0 - 10 scale in which 10 represents patient s best possible voice and 0 represents no voice at all   Since your last session how would you rate the effort of using your voice on average? Speaking - 3  Singing - 8 Speaking - 0  Singing - 8  Speaking - 3  Singing - 6         0 - 10 scale in which 10 represents maximal effort and 0 represents no effort at all     THERAPEUTIC ACTIVITIES  Today Mr. Javier participated in the following therapeutic activities:    Counseling and Education    Patient discussed the use of Flonase for rhinitis    Exercises to improve respiratory phonatory coordination     Awareness of excessive (inspiratory reserve) and insufficient (expiratory reserve) lung volumes    A counting task was utilized to promote patient recognition of increased respiratory engagement to counter recoil during exhalation, and habituate a low breath subsequent to this threshold, but before expiratory reserve volume    Patient reported improved awareness of these thresholds, and was able to operate within their boundaries with minimal clinician support    Concepts of optimal lung capacity were incorporated into the following exercises:    Maximal functional phrase length using chant like  counting    Restricted phrase length in groups of 5, 3, 1 using chant-like counting    Excellent accuracy with minimal clinician support    Reading passages    Good accuracy with minimal to moderate clinician support    Negative practice utilized at the conversational level    Persistent roughness noted following habitual voice quality, swallow substitution was beneficial for reducing this    Good accuracy with minimal to moderate clinician support      A revised regimen for home practice was instructed.    I provided an audio recording and handouts of today's therapeutic activities to facilitate practice.    ASSESSMENT/PLAN  PROGRESS TOWARD LONG TERM GOALS:   Adequate progress; please see above    IMPRESSIONS: Mr. Javier is making steady progress toward his goals.  With use of therapeutic techniques minimal roughness was appreciated during running speech, and improved awareness of respiratory volumes reduced pressed phonation both at maximal and minimal lung volumes.      PLAN: I will see Mr. Javier in three weeks to continue to habituate flowing easy voice, and apply these techniques to singing voice.   Goals for the interim are as follows:    Practice SOVT exercises intermittently throughout the day in short sessions    Maintain vigilence for good vocal hygiene, and optimal patterns of use in daily vocal demands    Practice breathing exercises 1 times daily    Raise awareness of use of optimal respiratory mechanics in daily voice use    Find one conversation per day to utilize negative practice, alternating between habitual voice and target voice during running speech    PRIMARY ICD-10 code:  R49.0 (Dysphonia)     TOTAL SERVICE TIME: 60 minutes  TREATMENT (65093): 60 minutes  NO CHARGE FACILITY FEE (21133)     Gideon Cortes M.M., M.A., CCC-SLP  Speech-Language Pathologist  Certificate of Vocology  [phone]

## 2017-09-18 NOTE — LETTER
9/18/2017      RE: Jarad Javier  [address]  [facility] [address]       [facility] VOICE [facility]  Michoacano Azul Jr., M.D., F.A.C.S.  Liseth Cordova M.D., M.P.H.  Carolina Abraham, Ph.D., CCC/SLP  Deandra Hoffman M.M. (voice), M.A., CCC/SLP  Joe Cortes M.M. (voice), M.A., CCC/SLP    [facility] VOICE [facility]  THERAPY PROGRESS REPORT    Patient: Jarad Javier  Date of Service: 9/18/2017  Date of Last Service: 9/5/17      I had the pleasure of seeing Mr. Javier for the third post-evaluation therapy session (CPT code 89215).  He was referred by his physician, Dr. Cordova, for medically necessary speech therapy to address a diagnosis of R49.0 (Dysphonia) in the context of an imbalance in function of the intrinsic and extrinsic muscles of the larynx.  His initial evaluation was performed by my colleague Deandra Hoffman CCC-SLP. Please refer to the initial evaluation report on 8/7/17 for complete details regarding diagnostic findings.    SUBJECTIVE:  Since his last sesion, Mr. Javier reports the following:     Voice is a bit better    Feels awareness of jaw tension is helpful    Forward focus in passaggio is helpful    Reduced fatigue    Feels as though he is still dealing consistent drainage    His primary complaint at this point is consistent sensation and sound of phlegm in the back of his throat.    OBJECTIVE:  Patient reported measures:  Question Session Date    8/21/17 9/5/17 9/18/17         Overall, I did my speech therapy exercises / techniques / strategies: NA 2 2          0 = n/a  1 = once or twice since last session  2 = three to five days per week 3 = almost every day  4 = one to two times per day  5 = three or more times per day   Since beginning your treatment, how confident do you feel in your ability to manage your current and future symptoms? NA  4  3         1 = Not at all   2 = Very little    3 = Somewhat   4 = Reasonably  5 = Very   Considering the progress since your last  appointment, please provide a response to the questions below based on the following options:   0 = N/A   1= Not at all   2 = Very little    3 = Somewhat    4 = Quite a bit     5 = A lot   To what extent has the treatment improved your symptoms? NA  1  3         To what extent has the treatment made your voice clearer? NA  0  3         To what extent has the treatment made it easier to talk? NA  0  0         To what extent has the treatment made it easier to sing? NA  1  3         To what extent are you able to implement the treatment techniques in your daily life? NA 4  3         To what extent are the treatment techniques habitual? NA 1  2         Since your last session how would you rate the quality of your voice on average? Speaking - 7  Singing - 5 Speaking - 8  Singing - 3 Speaking - 5  Singing - 4         0 - 10 scale in which 10 represents patient s best possible voice and 0 represents no voice at all   Since your last session how would you rate the effort of using your voice on average? Speaking - 3  Singing - 8 Speaking - 0  Singing - 8  Speaking - 3  Singing - 6         0 - 10 scale in which 10 represents maximal effort and 0 represents no effort at all     THERAPEUTIC ACTIVITIES  Today Mr. Javier participated in the following therapeutic activities:    Counseling and Education    Patient discussed the use of Flonase for rhinitis    Exercises to improve respiratory phonatory coordination     Awareness of excessive (inspiratory reserve) and insufficient (expiratory reserve) lung volumes    A counting task was utilized to promote patient recognition of increased respiratory engagement to counter recoil during exhalation, and habituate a low breath subsequent to this threshold, but before expiratory reserve volume    Patient reported improved awareness of these thresholds, and was able to operate within their boundaries with minimal clinician support    Concepts of optimal lung capacity were incorporated  into the following exercises:    Maximal functional phrase length using chant like counting    Restricted phrase length in groups of 5, 3, 1 using chant-like counting    Excellent accuracy with minimal clinician support    Reading passages    Good accuracy with minimal to moderate clinician support    Negative practice utilized at the conversational level    Persistent roughness noted following habitual voice quality, swallow substitution was beneficial for reducing this    Good accuracy with minimal to moderate clinician support      A revised regimen for home practice was instructed.    I provided an audio recording and handouts of today's therapeutic activities to facilitate practice.    ASSESSMENT/PLAN  PROGRESS TOWARD LONG TERM GOALS:   Adequate progress; please see above    IMPRESSIONS: Mr. Javier is making steady progress toward his goals.  With use of therapeutic techniques minimal roughness was appreciated during running speech, and improved awareness of respiratory volumes reduced pressed phonation both at maximal and minimal lung volumes.      PLAN: I will see Mr. Javier in three weeks to continue to habituate flowing easy voice, and apply these techniques to singing voice.   Goals for the interim are as follows:    Practice SOVT exercises intermittently throughout the day in short sessions    Maintain vigilence for good vocal hygiene, and optimal patterns of use in daily vocal demands    Practice breathing exercises 1 times daily    Raise awareness of use of optimal respiratory mechanics in daily voice use    Find one conversation per day to utilize negative practice, alternating between habitual voice and target voice during running speech    PRIMARY ICD-10 code:  R49.0 (Dysphonia)     TOTAL SERVICE TIME: 60 minutes  TREATMENT (76236): 60 minutes  NO CHARGE FACILITY FEE (68839)     Gideon Cortes M.M., M.A., CCC-SLP  Speech-Language Pathologist  Certificate of Vocology  [phone]

## 2017-10-06 ENCOUNTER — OFFICE VISIT (OUTPATIENT)
Dept: FAMILY MEDICINE | Facility: CLINIC | Age: 62
End: 2017-10-06
Payer: COMMERCIAL

## 2017-10-06 VITALS
SYSTOLIC BLOOD PRESSURE: 132 MMHG | DIASTOLIC BLOOD PRESSURE: 82 MMHG | TEMPERATURE: 98 F | OXYGEN SATURATION: 96 % | BODY MASS INDEX: 36.43 KG/M2 | HEART RATE: 95 BPM | WEIGHT: 254.5 LBS | HEIGHT: 70 IN

## 2017-10-06 DIAGNOSIS — I10 ESSENTIAL HYPERTENSION: Primary | ICD-10-CM

## 2017-10-06 DIAGNOSIS — Z23 FLU VACCINE NEED: ICD-10-CM

## 2017-10-06 DIAGNOSIS — Z23 NEED FOR SHINGLES VACCINE: ICD-10-CM

## 2017-10-06 DIAGNOSIS — F41.9 ANXIETY: ICD-10-CM

## 2017-10-06 DIAGNOSIS — Z91.89 AT RISK FOR HEART DISEASE: ICD-10-CM

## 2017-10-06 PROCEDURE — 90736 HZV VACCINE LIVE SUBQ: CPT | Performed by: FAMILY MEDICINE

## 2017-10-06 PROCEDURE — 99214 OFFICE O/P EST MOD 30 MIN: CPT | Mod: 25 | Performed by: FAMILY MEDICINE

## 2017-10-06 PROCEDURE — 90471 IMMUNIZATION ADMIN: CPT | Performed by: FAMILY MEDICINE

## 2017-10-06 PROCEDURE — 90472 IMMUNIZATION ADMIN EACH ADD: CPT | Performed by: FAMILY MEDICINE

## 2017-10-06 PROCEDURE — 90686 IIV4 VACC NO PRSV 0.5 ML IM: CPT | Performed by: FAMILY MEDICINE

## 2017-10-06 RX ORDER — FLUTICASONE PROPIONATE 50 MCG
1 SPRAY, SUSPENSION (ML) NASAL DAILY
COMMUNITY

## 2017-10-06 RX ORDER — SIMVASTATIN 20 MG
20 TABLET ORAL AT BEDTIME
Qty: 90 TABLET | Refills: 3 | Status: SHIPPED | OUTPATIENT
Start: 2017-10-06 | End: 2018-12-28

## 2017-10-06 RX ORDER — CITALOPRAM HYDROBROMIDE 20 MG/1
20 TABLET ORAL DAILY
Qty: 90 TABLET | Refills: 3 | Status: SHIPPED | OUTPATIENT
Start: 2017-10-06 | End: 2018-12-28

## 2017-10-06 RX ORDER — LISINOPRIL AND HYDROCHLOROTHIAZIDE 12.5; 2 MG/1; MG/1
TABLET ORAL
COMMUNITY
Start: 2017-09-15 | End: 2017-10-06

## 2017-10-06 RX ORDER — LISINOPRIL AND HYDROCHLOROTHIAZIDE 12.5; 2 MG/1; MG/1
2 TABLET ORAL DAILY
Qty: 180 TABLET | Refills: 3 | Status: SHIPPED | OUTPATIENT
Start: 2017-10-06 | End: 2018-11-08

## 2017-10-06 ASSESSMENT — PATIENT HEALTH QUESTIONNAIRE - PHQ9: SUM OF ALL RESPONSES TO PHQ QUESTIONS 1-9: 1

## 2017-10-06 NOTE — MR AVS SNAPSHOT
After Visit Summary   10/6/2017    Jarad Javier    MRN: [id number]           Patient Information     Date Of Birth          1955        Visit Information        Provider Department      10/6/2017 10:30 AM Edilson Kim MD [facility]        Today's Diagnoses     Essential hypertension    -  1    Anxiety        At risk for heart disease        Need for shingles vaccine        Flu vaccine need           Follow-ups after your visit        Your next 10 appointments already scheduled     Oct 09, 2017  8:00 AM CDT   (Arrive by 7:45 AM)   NEW SLEEP VOICE with AAKASH Martinez   [facility] ([facility])    [address]  [address] [facility] [address] [phone]              Who to contact     If you have questions or need follow up information about today's clinic visit or your schedule please contact [facility] directly at [phone].  Normal or non-critical lab and imaging results will be communicated to you by MyChart, letter or phone within 4 business days after the clinic has received the results. If you do not hear from us within 7 days, please contact the clinic through [employer] or phone. If you have a critical or abnormal lab result, we will notify you by phone as soon as possible.  Submit refill requests through [employer] or call your pharmacy and they will forward the refill request to us. Please allow 3 business days for your refill to be completed.          Additional Information About Your Visit        [employer] Information     [employer] gives you secure access to your electronic health record. If you see a primary care provider, you can also send messages to your care team and make appointments. If you have questions, please call your primary care clinic.  If you do not have a primary care provider, please call [phone] and they will assist you.        Care EveryWhere ID     This is your Care EveryWhere  "ID. This could be used by other organizations to access your [place] medical records  [id number]        Your Vitals Were     Pulse Temperature Height Pulse Oximetry BMI (Body Mass Index)       95 98  F (36.7  C) (Oral) 5' 10\" (1.778 m) 96% 36.52 kg/m2        Blood Pressure from Last 3 Encounters:   10/06/17 132/82   08/28/17 148/70   08/23/17 154/83    Weight from Last 3 Encounters:   10/06/17 254 lb 8 oz (115.4 kg)   08/28/17 255 lb (115.7 kg)   08/23/17 255 lb (115.7 kg)              We Performed the Following     HC FLU VAC PRESRV FREE QUAD SPLIT VIR 3+YRS IM     ZOSTER VACC LIVE SUBQ NJX          Today's Medication Changes          These changes are accurate as of: 10/6/17 12:21 PM.  If you have any questions, ask your nurse or doctor.               Start taking these medicines.        Dose/Directions    simvastatin 20 MG tablet   Commonly known as:  ZOCOR   Used for:  At risk for heart disease   Started by:  Edilson Kim MD        Dose:  20 mg   Take 1 tablet (20 mg) by mouth At Bedtime   Quantity:  90 tablet   Refills:  3         These medicines have changed or have updated prescriptions.        Dose/Directions    citalopram 20 MG tablet   Commonly known as:  celeXA   This may have changed:  when to take this   Used for:  Anxiety   Changed by:  Edilson Kim MD        Dose:  20 mg   Take 1 tablet (20 mg) by mouth daily   Quantity:  90 tablet   Refills:  3       lisinopril-hydrochlorothiazide 20-12.5 MG per tablet   Commonly known as:  PRINZIDE/ZESTORETIC   This may have changed:    - how much to take  - how to take this  - when to take this   Used for:  Essential hypertension   Changed by:  Edilson Kim MD        Dose:  2 tablet   Take 2 tablets by mouth daily   Quantity:  180 tablet   Refills:  3         Stop taking these medicines if you haven't already. Please contact your care team if you have questions.     LISINOPRIL PO   Stopped by:  Edilson Kim MD     "            Where to get your medicines      These medications were sent to [id number] - [facility] [address] [facility]  [address] [facility] [address]     Phone:  [phone]     citalopram 20 MG tablet    lisinopril-hydrochlorothiazide 20-12.5 MG per tablet    simvastatin 20 MG tablet                Primary Care Provider Office Phone # Fax #    Edilson Gilson Kim [phone] [address] [facility] [address]        Equal Access to Services     [facility]: Hadii aad ku hadasho Soomaali, waaxda luqadaha, qaybta kaalmada adeegyapatricia, ethan gould . So [facility] [phone].    ATENCIÓN: Si habla español, tiene a ramirez disposición servicios gratuitos de asistencia lingüística. [facility] [phone].    We comply with applicable federal civil rights laws and [place] laws. We do not discriminate on the basis of race, color, national origin, age, disability, sex, sexual orientation, or gender identity.            Thank you!     Thank you for choosing [facility]  for your care. Our goal is always to provide you with excellent care. Hearing back from our patients is one way we can continue to improve our services. Please take a few minutes to complete the written survey that you may receive in the mail after your visit with us. Thank you!             Your Updated Medication List - Protect others around you: Learn how to safely use, store and throw away your medicines at www.disposemymeds.org.          This list is accurate as of: 10/6/17 12:21 PM.  Always use your most recent med list.                   Brand Name Dispense Instructions for use Diagnosis    cholecalciferol 5000 UNITS Caps capsule    vitamin D3     Take 5,000 Units by mouth        citalopram 20 MG tablet    celeXA    90 tablet    Take 1 tablet (20 mg) by mouth daily    Anxiety       coenzyme Q-10 10 MG Caps           fluticasone 50 MCG/ACT spray    FLONASE      Spray 1 spray into both nostrils daily        ibuprofen 800 MG tablet    ADVIL/MOTRIN     Take 800 mg by mouth        lisinopril-hydrochlorothiazide 20-12.5 MG per tablet    PRINZIDE/ZESTORETIC    180 tablet    Take 2 tablets by mouth daily    Essential hypertension       MULTIVITAMIN ADULT PO      Focus factor, multivitamin, takes daily        omeprazole 20 MG CR capsule    priLOSEC     Take 20 mg by mouth        simvastatin 20 MG tablet    ZOCOR    90 tablet    Take 1 tablet (20 mg) by mouth At Bedtime    At risk for heart disease

## 2017-10-06 NOTE — PROGRESS NOTES
SUBJECTIVE:   Jarad Javier is a 61 year old male who presents to clinic today for the following health issues:      Hypertension Follow-up      Outpatient blood pressures are not being checked.    Low Salt Diet: no added salt      Amount of exercise or physical activity: 5 x week    Problems taking medications regularly: No    Medication side effects: none  Diet: regular (no restrictions)      Depression and Anxiety Follow-Up    Status since last visit: Improved with celexa, pt states stable on this dose, no side effects    Other associated symptoms:None    Complicating factors:     Significant life event: No     Current substance abuse: None    Over the past couple years has had a number of anxiety producing events including the transition of his male partner to female      PHQ-9 Score and MyChart F/U Questions 10/6/2017   Total Score 1   Q9: Suicide Ideation Not at all     No flowsheet data found.    PHQ-9  English  PHQ-9   Any Language  GAD7  Suicide Assessment Five-step Evaluation and Treatment (SAFE-T)      Problem list and histories reviewed & adjusted, as indicated.  Additional history: as documented    Current symptoms are described by the PHQ9/LAWRENCE below.  PHQ-9 SCORE 10/6/2017   Total Score 1     No flowsheet data found.        No known medical causes of depression or Anxiety.    ROS:  Musculoskeletal: Normal; movements are symmetrical, no weakness  Neuro: Normal; no tremor  Psych: No suicidal thoughts or plan. No halluciations.    Social History  Social History     Social History     Marital status: Single     Spouse name: N/A     Number of children: N/A     Years of education: N/A     Occupational History     Not on file.     Social History Main Topics     Smoking status: Never Smoker     Smokeless tobacco: Never Used     Alcohol use Yes      Comment: Very occasional     Drug use: No     Sexual activity: Not Currently     Partners: Male     Birth control/ protection: None     Other Topics Concern  "    Not on file     Social History Narrative       Medications:    Current Outpatient Prescriptions:      fluticasone (FLONASE) 50 MCG/ACT spray, Spray 1 spray into both nostrils daily, Disp: , Rfl:      Multiple Vitamins-Minerals (MULTIVITAMIN ADULT PO), Focus factor, multivitamin, takes daily, Disp: , Rfl:      citalopram (CELEXA) 20 MG tablet, Take 1 tablet (20 mg) by mouth daily, Disp: 90 tablet, Rfl: 3     lisinopril-hydrochlorothiazide (PRINZIDE/ZESTORETIC) 20-12.5 MG per tablet, Take 2 tablets by mouth daily, Disp: 180 tablet, Rfl: 3     simvastatin (ZOCOR) 20 MG tablet, Take 1 tablet (20 mg) by mouth At Bedtime, Disp: 90 tablet, Rfl: 3     cholecalciferol (VITAMIN D3) 5000 UNITS CAPS capsule, Take 5,000 Units by mouth, Disp: , Rfl:      coenzyme Q-10 10 MG CAPS, , Disp: , Rfl:      ibuprofen (ADVIL/MOTRIN) 800 MG tablet, Take 800 mg by mouth, Disp: , Rfl:      [DISCONTINUED] lisinopril-hydrochlorothiazide (PRINZIDE/ZESTORETIC) 20-12.5 MG per tablet, , Disp: , Rfl:      omeprazole (PRILOSEC) 20 MG CR capsule, Take 20 mg by mouth, Disp: , Rfl:      [DISCONTINUED] citalopram (CELEXA) 20 MG tablet, Take 20 mg by mouth, Disp: , Rfl:      No Known Allergies        OBJECTIVE:  /82  Pulse 95  Temp 98  F (36.7  C) (Oral)  Ht 5' 10\" (1.778 m)  Wt 254 lb 8 oz (115.4 kg)  SpO2 96%  BMI 36.52 kg/m2  Mental Status exam  GROOMING: well groomed.  ATTIRE: Appropriate.  AGE: Appears stated.  BEHAVIOR TOWARDS EXAMINER: Cooperative.  MOTOR ACTIVITY: Unremarkable.  EYE CONTACT: Appropriate.  Mood:  good  Affect:  appropriate and in normal range  SPEECH/LANGUAGE: Unremarkable.  ATTENTION: Unremarkable.  CONCENTRATION: Unremarkable.  THOUGHT PROCESS: Unremarkable  THOUGHT CONTENT: Unremarkable for hallucinations and delusions.  ORIENTATION: Times 3.  MEMORY: No evidence of impairment.  JUDGMENT: No evidence of impairment.  ESTIMATED INTELLIGENCE: above Average.  DEMONSTRATED INSIGHT: good  FUND OF KNOWLEDGE: " good  SUICIDE RISK: None apparent and denied.    ASSESSMENT/PLAN:  1. Essential hypertension    Blood pressure incompletely controlled increase medication today  - lisinopril-hydrochlorothiazide (PRINZIDE/ZESTORETIC) 20-12.5 MG per tablet; Take 2 tablets by mouth daily  Dispense: 180 tablet; Refill: 3    2. Anxiety  Stable anxiety and think he should continue this medication as it seems to be continuing to be helpful  - citalopram (CELEXA) 20 MG tablet; Take 1 tablet (20 mg) by mouth daily  Dispense: 90 tablet; Refill: 3    3. At risk for heart disease  Patient had some outside screening labs done including risk factor calculation of 13% based on systolic blood pressure of 150, treated, and total cholesterol of 152 and HDL of 36  Improving blood pressure and recalculated still gives him a risk of about 9-10%  GuidelinesIwouldrecommendstartingastatinaswellforprimaryprevention  - simvastatin (ZOCOR) 20 MG tablet; Take 1 tablet (20 mg) by mouth At Bedtime  Dispense: 90 tablet; Refill: 3    4. Need for shingles vaccine  - ZOSTER VACC LIVE SUBQ NJX    5. Flu vaccine need  - HC FLU VAC PRESRV FREE QUAD SPLIT VIR 3+YRS IM    Counselled on medication choice, use, side effects of medications, nonprescription adjunct treatment and appropriate follow up. Couns time: 20 minutes of 25 minutes total face to face time.    Edilson Kim MD MPH

## 2017-10-06 NOTE — NURSING NOTE
"Chief Complaint   Patient presents with     Establish Care     Imm/Inj     get what is needed     /82  Pulse 95  Temp 98  F (36.7  C) (Oral)  Ht 5' 10\" (1.778 m)  Wt 254 lb 8 oz (115.4 kg)  SpO2 96%  BMI 36.52 kg/m2 Estimated body mass index is 36.52 kg/(m^2) as calculated from the following:    Height as of this encounter: 5' 10\" (1.778 m).    Weight as of this encounter: 254 lb 8 oz (115.4 kg).  Medication Reconciliation: complete      Health Maintenance due pending provider review:  Colonoscopy/FIT    Sent to abstracting, completed 10/31/2008    Cami Clayton CMA  "

## 2018-06-04 ENCOUNTER — OFFICE VISIT (OUTPATIENT)
Dept: FAMILY MEDICINE | Facility: CLINIC | Age: 63
End: 2018-06-04
Payer: COMMERCIAL

## 2018-06-04 VITALS
DIASTOLIC BLOOD PRESSURE: 78 MMHG | HEART RATE: 89 BPM | WEIGHT: 262.8 LBS | HEIGHT: 70 IN | TEMPERATURE: 98.3 F | BODY MASS INDEX: 37.62 KG/M2 | RESPIRATION RATE: 16 BRPM | SYSTOLIC BLOOD PRESSURE: 128 MMHG | OXYGEN SATURATION: 99 %

## 2018-06-04 DIAGNOSIS — I10 ESSENTIAL HYPERTENSION: ICD-10-CM

## 2018-06-04 DIAGNOSIS — M18.10 OSTEOARTHRITIS OF THUMB, UNSPECIFIED LATERALITY: ICD-10-CM

## 2018-06-04 DIAGNOSIS — Z12.11 SPECIAL SCREENING FOR MALIGNANT NEOPLASMS, COLON: ICD-10-CM

## 2018-06-04 DIAGNOSIS — Z12.5 SCREENING FOR PROSTATE CANCER: Primary | ICD-10-CM

## 2018-06-04 LAB — PSA SERPL-ACNC: 0.29 UG/L (ref 0–4)

## 2018-06-04 PROCEDURE — 99214 OFFICE O/P EST MOD 30 MIN: CPT | Performed by: FAMILY MEDICINE

## 2018-06-04 PROCEDURE — G0103 PSA SCREENING: HCPCS | Performed by: FAMILY MEDICINE

## 2018-06-04 NOTE — PROGRESS NOTES
SUBJECTIVE:   Jarad Javier is a 62 year old male who presents to clinic today for the following health issues:      Chief Complaint   Patient presents with     Consult     Patient would like to have PSA done- strong family hx of Prostate Cancer (brother had prostate removed last week due to cancer)     Pain     Pt also has concerns about some finger pain and stiffness throughout his hands       Patient is interested in talking about risk for prostate cancer  His brother just had his prostate removed due to this and he also has several other family members who have had prostate cancer  He is never had a PSA test and has previously had a prostate exam but not recently    He does not have any urinary symptoms today negative for urinary hesitancy decreased stream or irritability or incontinence    He had a ureteral stone in the last year but has not had any trouble with that since    Also following up today for his hypertension which has been much improved after increasing his Zestoretic    He also had some questions about osteoarthritis and the base of both of his thumbs and mother notes okay to take ibuprofen regularly    We discussed that NSAIDs are associated with increased risk for heart disease Tylenol is not associated with risk for heart disease though he does not find that terribly helpful  Aspirin is another option but is not associated with increased risk for stomach bleeding   basically this is a lifestyle issue     he does have a family history of rheumatoid arthritis and wonders if that this is possible      ROS: As per HPI.  Constitutional: no recent illness, no fevers/sweats/chills  Eyes: No eye irritation  Ears/Nose/Throat: No sores or sore throat  Lymph: no swollen nodes    Allergies, reviewed:   No Known Allergies   Med list prior to vist:    Current Outpatient Prescriptions on File Prior to Visit:  cholecalciferol (VITAMIN D3) 5000 UNITS CAPS capsule Take 5,000 Units by mouth   citalopram  "(CELEXA) 20 MG tablet Take 1 tablet (20 mg) by mouth daily   coenzyme Q-10 10 MG CAPS    fluticasone (FLONASE) 50 MCG/ACT spray Spray 1 spray into both nostrils daily   ibuprofen (ADVIL/MOTRIN) 800 MG tablet Take 800 mg by mouth   lisinopril-hydrochlorothiazide (PRINZIDE/ZESTORETIC) 20-12.5 MG per tablet Take 2 tablets by mouth daily   Multiple Vitamins-Minerals (MULTIVITAMIN ADULT PO) Focus factor, multivitamin, takes daily   omeprazole (PRILOSEC) 20 MG CR capsule Take 20 mg by mouth   simvastatin (ZOCOR) 20 MG tablet Take 1 tablet (20 mg) by mouth At Bedtime     No current facility-administered medications on file prior to visit.   Medications reviewed and updated    Objective:  /78  Pulse 89  Temp 98.3  F (36.8  C) (Oral)  Resp 16  Ht 5' 10\" (1.778 m)  Wt 262 lb 12.8 oz (119.2 kg)  SpO2 99%  BMI 37.71 kg/m2  General Appearance: Pleasant, alert, WN/WD in no acute respiratory distress.  Ms: No defining features of rheumatoid arthritis no nodules, deformit and   Symptoms are mostly the bases of both thumbs  Prostate exam today was normal without masses  Neurologic Exam: Nonfocal, no tremor. Normal gait.  Psychiatric Exam: Alert - appropriate, normal affect    ASSESSMENT/PLAN:    ICD-10-CM    1. Screening for prostate cancer Z12.5 PSA, screen   2. Essential hypertension I10    3. Osteoarthritis of thumb, unspecified laterality M18.10    4. Special screening for malignant neoplasms, colon Z12.11 GASTROENTEROLOGY ADULT REF PROCEDURE ONLY Abdias Renee [phone]; Dr. SUSHIL Garber      Discussed screening for prostate cancer  Normal prostate exam    Hypertension is improved    Osteoarthritis discussed with use of NSAIDs  He does have a family history of rheumatoid arthritis but I do not see any suggestive features today however if the symptoms worsen we may consider investigating this further    Follow-up as needed or later in the year for a physical    Edilson Gilson Kim MD MPH    "

## 2018-10-09 ENCOUNTER — HOSPITAL ENCOUNTER (OUTPATIENT)
Facility: CLINIC | Age: 63
Discharge: HOME | End: 2018-10-09
Attending: SPECIALIST | Admitting: SPECIALIST
Payer: MEDICARE

## 2018-10-09 ENCOUNTER — SURGERY (OUTPATIENT)
Age: 63
End: 2018-10-09

## 2018-10-09 VITALS
HEIGHT: 70 IN | RESPIRATION RATE: 16 BRPM | DIASTOLIC BLOOD PRESSURE: 81 MMHG | HEART RATE: 68 BPM | BODY MASS INDEX: 35.79 KG/M2 | WEIGHT: 250 LBS | SYSTOLIC BLOOD PRESSURE: 129 MMHG | OXYGEN SATURATION: 93 %

## 2018-10-09 LAB — COLONOSCOPY: NORMAL

## 2018-10-09 PROCEDURE — G0500 MOD SEDAT ENDO SERVICE >5YRS: HCPCS | Performed by: SPECIALIST

## 2018-10-09 PROCEDURE — 88305 TISSUE EXAM BY PATHOLOGIST: CPT | Mod: 26 | Performed by: SPECIALIST

## 2018-10-09 PROCEDURE — 45385 COLONOSCOPY W/LESION REMOVAL: CPT | Performed by: SPECIALIST

## 2018-10-09 PROCEDURE — 25000128 H RX IP 250 OP 636: Performed by: SPECIALIST

## 2018-10-09 PROCEDURE — 88305 TISSUE EXAM BY PATHOLOGIST: CPT | Performed by: SPECIALIST

## 2018-10-09 PROCEDURE — 99153 MOD SED SAME PHYS/QHP EA: CPT

## 2018-10-09 RX ORDER — LIDOCAINE 40 MG/G
CREAM TOPICAL
Status: DISCONTINUED | OUTPATIENT
Start: 2018-10-09 | End: 2018-10-09 | Stop reason: HOSPADM

## 2018-10-09 RX ORDER — ONDANSETRON 2 MG/ML
4 INJECTION INTRAMUSCULAR; INTRAVENOUS EVERY 6 HOURS PRN
Status: CANCELLED | OUTPATIENT
Start: 2018-10-09

## 2018-10-09 RX ORDER — ONDANSETRON 2 MG/ML
4 INJECTION INTRAMUSCULAR; INTRAVENOUS
Status: DISCONTINUED | OUTPATIENT
Start: 2018-10-09 | End: 2018-10-09 | Stop reason: HOSPADM

## 2018-10-09 RX ORDER — NALOXONE HYDROCHLORIDE 0.4 MG/ML
.1-.4 INJECTION, SOLUTION INTRAMUSCULAR; INTRAVENOUS; SUBCUTANEOUS
Status: CANCELLED | OUTPATIENT
Start: 2018-10-09 | End: 2018-10-10

## 2018-10-09 RX ORDER — ONDANSETRON 4 MG/1
4 TABLET, ORALLY DISINTEGRATING ORAL EVERY 6 HOURS PRN
Status: CANCELLED | OUTPATIENT
Start: 2018-10-09

## 2018-10-09 RX ORDER — FLUMAZENIL 0.1 MG/ML
0.2 INJECTION, SOLUTION INTRAVENOUS
Status: CANCELLED | OUTPATIENT
Start: 2018-10-09 | End: 2018-10-09

## 2018-10-09 RX ORDER — FENTANYL CITRATE 50 UG/ML
INJECTION, SOLUTION INTRAMUSCULAR; INTRAVENOUS PRN
Status: DISCONTINUED | OUTPATIENT
Start: 2018-10-09 | End: 2018-10-09 | Stop reason: HOSPADM

## 2018-10-09 RX ADMIN — FENTANYL CITRATE 25 MCG: 50 INJECTION, SOLUTION INTRAMUSCULAR; INTRAVENOUS at 08:30

## 2018-10-09 RX ADMIN — MIDAZOLAM 1 MG: 1 INJECTION INTRAMUSCULAR; INTRAVENOUS at 08:26

## 2018-10-09 RX ADMIN — FENTANYL CITRATE 50 MCG: 50 INJECTION, SOLUTION INTRAMUSCULAR; INTRAVENOUS at 08:25

## 2018-10-09 RX ADMIN — FENTANYL CITRATE 25 MCG: 50 INJECTION, SOLUTION INTRAMUSCULAR; INTRAVENOUS at 08:34

## 2018-10-09 RX ADMIN — MIDAZOLAM 0.5 MG: 1 INJECTION INTRAMUSCULAR; INTRAVENOUS at 08:30

## 2018-10-09 RX ADMIN — MIDAZOLAM 0.5 MG: 1 INJECTION INTRAMUSCULAR; INTRAVENOUS at 08:34

## 2018-10-09 NOTE — H&P
Pre-Endoscopy History and Physical     Jarad Javier MRN# [id number]   YOB: 1955 Age: 62 year old     Date of Procedure: 10/9/2018  Primary care provider: Edilson Kim  Type of Endoscopy: Colonoscopy with possible biopsy, possible polypectomy  Reason for Procedure: screening  Type of Anesthesia Anticipated: Conscious Sedation    HPI:    Jarad is a 62 year old male who will be undergoing the above procedure.      A history and physical has been performed. The patient's medications and allergies have been reviewed. The risks and benefits of the procedure and the sedation options and risks were discussed with the patient.  All questions were answered and informed consent was obtained.      He denies a personal or family history of anesthesia complications or bleeding disorders.     Patient Active Problem List   Diagnosis     Allergic rhinitis     Anxiety     Chronic rhinitis     Essential hypertension     Screen for colon cancer     Dysfunction of eustachian tube     Dysphonia        Past Medical History:   Diagnosis Date     Anxiety 2013    I take Citalopram     Arthritis 2002    Some in hands but mostly feet, big toe joints     Gastroesophageal reflux disease 2012?    Take antacids when needed     Hypertension 1970's    Borderline high to high, white coat syndrome        Past Surgical History:   Procedure Laterality Date     ADENOIDECTOMY  1960's    When I was about 8     COLONOSCOPY       TONSILLECTOMY  1960's    When I was about 8       Social History   Substance Use Topics     Smoking status: Never Smoker     Smokeless tobacco: Never Used     Alcohol use Yes      Comment: Very occasional       Family History   Problem Relation Age of Onset     Hypertension Mother      Asthma Mother      Glaucoma No family hx of      Macular Degeneration No family hx of      Diabetes No family hx of        Prior to Admission medications    Medication Sig Start Date End Date Taking? Authorizing  "Provider   cholecalciferol (VITAMIN D3) 5000 UNITS CAPS capsule Take 5,000 Units by mouth 6/20/14  Yes Reported, Patient   citalopram (CELEXA) 20 MG tablet Take 1 tablet (20 mg) by mouth daily 10/6/17  Yes Edilson Kim MD   coenzyme Q-10 10 MG CAPS  6/20/14  Yes Reported, Patient   lisinopril-hydrochlorothiazide (PRINZIDE/ZESTORETIC) 20-12.5 MG per tablet Take 2 tablets by mouth daily 10/6/17  Yes Edilson Kim MD   Multiple Vitamins-Minerals (MULTIVITAMIN ADULT PO) Focus factor, multivitamin, takes daily   Yes Reported, Patient   simvastatin (ZOCOR) 20 MG tablet Take 1 tablet (20 mg) by mouth At Bedtime 10/6/17  Yes Edilson Kim MD   fluticasone (FLONASE) 50 MCG/ACT spray Spray 1 spray into both nostrils daily    Reported, Patient   ibuprofen (ADVIL/MOTRIN) 800 MG tablet Take 800 mg by mouth 12/22/14   Reported, Patient   omeprazole (PRILOSEC) 20 MG CR capsule Take 20 mg by mouth 5/22/12   Reported, Patient       No Known Allergies     REVIEW OF SYSTEMS:   5 point ROS negative except as noted above in HPI, including Gen., Resp., CV, GI &  system review.    PHYSICAL EXAM:   /88  Pulse 68  Resp 16  Ht 1.778 m (5' 10\")  Wt 113.4 kg (250 lb)  SpO2 95%  BMI 35.87 kg/m2 Estimated body mass index is 35.87 kg/(m^2) as calculated from the following:    Height as of this encounter: 1.778 m (5' 10\").    Weight as of this encounter: 113.4 kg (250 lb).   GENERAL APPEARANCE: alert, and oriented  MENTAL STATUS: alert  AIRWAY EXAM: Mallampatti Class I (visualization of the soft palate, fauces, uvula, anterior and posterior pillars)  RESP: lungs clear to auscultation - no rales, rhonchi or wheezes  CV: regular rates and rhythm  DIAGNOSTICS:    Not indicated    IMPRESSION   ASA Class 2 - Mild systemic disease    PLAN:   Plan for Colonoscopy with possible biopsy, possible polypectomy. We discussed the risks, benefits and alternatives and the patient wished to proceed.    The above has " been forwarded to the consulting provider.      Signed Electronically by: Anurag Garber  October 9, 2018

## 2018-10-09 NOTE — BRIEF OP NOTE
[facility] Brief Operative Note    Pre-operative diagnosis: screening    Post-operative diagnosis diminutive polyp     Procedure: Procedure(s):  colonoscopy - Wound Class: II-Clean Contaminated   Surgeon(s): Surgeon(s) and Role:     * Anurag Garber MD - Primary   Estimated blood loss: * No values recorded between 10/9/2018 12:00 AM and 10/9/2018  8:56 AM *    Specimens:   ID Type Source Tests Collected by Time Destination   A :  Polyp Large Intestine, Transverse SURGICAL PATHOLOGY EXAM Aletha June RN 10/9/2018  8:46 AM       Findings: Please see ProVation procedure note in Chart Review

## 2018-10-10 LAB — COPATH REPORT: NORMAL

## 2018-11-08 DIAGNOSIS — I10 ESSENTIAL HYPERTENSION: ICD-10-CM

## 2018-11-08 RX ORDER — LISINOPRIL AND HYDROCHLOROTHIAZIDE 12.5; 2 MG/1; MG/1
TABLET ORAL
Qty: 180 TABLET | Refills: 0 | Status: SHIPPED | OUTPATIENT
Start: 2018-11-08 | End: 2019-01-07

## 2018-11-08 NOTE — TELEPHONE ENCOUNTER
Message from GIANNI below in my documentation  GIANNI said:  Ok x 1, due for annual f/u of BP meds  Also turned 63 can do a welcome to medicare phsyical

## 2018-11-08 NOTE — TELEPHONE ENCOUNTER
"  Ok x 1, due for annual f/u of BP meds    Also turned 63 can do a welcome to medicare phsyical      Routing refill request to provider for review/approval because:  Labs not current:  Up to date on OV but due for BMP  Alison BUTLER RN    Requested Prescriptions   Pending Prescriptions Disp Refills     lisinopril-hydrochlorothiazide (PRINZIDE/ZESTORETIC) 20-12.5 MG per tablet [Pharmacy Med Name: Lisinopril-Hydrochlorothiazide Oral Tablet 20-12.5 MG] 180 tablet 2     Sig: TAKE 2 TABLETS BY MOUTH DAILY    Diuretics (Including Combos) Protocol Failed    11/8/2018 10:53 AM       Failed - Normal serum creatinine on file in past 12 months    Recent Labs   Lab Test  08/23/17   0734   CR  0.85             Failed - Normal serum potassium on file in past 12 months    Recent Labs   Lab Test  08/23/17   0734   POTASSIUM  4.1                   Failed - Normal serum sodium on file in past 12 months    Recent Labs   Lab Test  08/23/17   0734   NA  139             Passed - Blood pressure under 140/90 in past 12 months    BP Readings from Last 3 Encounters:   10/09/18 129/81   06/04/18 128/78   10/06/17 132/82                Passed - Recent (12 mo) or future (30 days) visit within the authorizing provider's specialty    Patient had office visit in the last 12 months or has a visit in the next 30 days with authorizing provider or within the authorizing provider's specialty.  See \"Patient Info\" tab in inbasket, or \"Choose Columns\" in Meds & Orders section of the refill encounter.             Passed - Patient is age 18 or older            "

## 2018-12-28 DIAGNOSIS — F41.9 ANXIETY: ICD-10-CM

## 2018-12-28 DIAGNOSIS — Z91.89 AT RISK FOR HEART DISEASE: ICD-10-CM

## 2018-12-31 RX ORDER — SIMVASTATIN 20 MG
TABLET ORAL
Qty: 30 TABLET | Refills: 0 | Status: SHIPPED | OUTPATIENT
Start: 2018-12-31 | End: 2019-01-07

## 2018-12-31 RX ORDER — CITALOPRAM HYDROBROMIDE 20 MG/1
TABLET ORAL
Qty: 30 TABLET | Refills: 0 | Status: SHIPPED | OUTPATIENT
Start: 2018-12-31 | End: 2019-01-07

## 2018-12-31 NOTE — TELEPHONE ENCOUNTER
"Medication is being filled for 1 time refill only due to:  upcoming appt   Alison BTULER RN    Requested Prescriptions   Pending Prescriptions Disp Refills     citalopram (CELEXA) 20 MG tablet [Pharmacy Med Name: Citalopram Hydrobromide Oral Tablet 20 MG] 90 tablet 2     Sig: TAKE 1 TABLET BY MOUTH DAILY    SSRIs Protocol Passed - 12/28/2018 12:32 PM       Passed - Recent (12 mo) or future (30 days) visit within the authorizing provider's specialty    Patient had office visit in the last 12 months or has a visit in the next 30 days with authorizing provider or within the authorizing provider's specialty.  See \"Patient Info\" tab in inbasket, or \"Choose Columns\" in Meds & Orders section of the refill encounter.             Passed - Patient is age 18 or older        simvastatin (ZOCOR) 20 MG tablet [Pharmacy Med Name: Simvastatin Oral Tablet 20 MG] 90 tablet 2     Sig: TAKE 1 TABLET BY MOUTH AT BEDTIME    Statins Protocol Failed - 12/28/2018 12:32 PM       Failed - LDL on file in past 12 months    Recent Labs   Lab Test 08/14/17   LDL 91            Passed - No abnormal creatine kinase in past 12 months    No lab results found.            Passed - Recent (12 mo) or future (30 days) visit within the authorizing provider's specialty    Patient had office visit in the last 12 months or has a visit in the next 30 days with authorizing provider or within the authorizing provider's specialty.  See \"Patient Info\" tab in inbasket, or \"Choose Columns\" in Meds & Orders section of the refill encounter.             Passed - Patient is age 18 or older        Next 5 appointments (look out 90 days)    Jan 07, 2019  8:00 AM CST  Office Visit with Edilson Kim MD  [facility] ([facility]) [address] [address] [phone]          "

## 2019-01-07 ENCOUNTER — OFFICE VISIT (OUTPATIENT)
Dept: FAMILY MEDICINE | Facility: CLINIC | Age: 64
End: 2019-01-07
Payer: MEDICARE

## 2019-01-07 VITALS
HEIGHT: 69 IN | RESPIRATION RATE: 16 BRPM | DIASTOLIC BLOOD PRESSURE: 89 MMHG | SYSTOLIC BLOOD PRESSURE: 134 MMHG | BODY MASS INDEX: 38.51 KG/M2 | OXYGEN SATURATION: 99 % | WEIGHT: 260 LBS | TEMPERATURE: 97.8 F | HEART RATE: 76 BPM

## 2019-01-07 DIAGNOSIS — Z91.89 AT RISK FOR HEART DISEASE: ICD-10-CM

## 2019-01-07 DIAGNOSIS — Z23 NEED FOR VACCINATION: ICD-10-CM

## 2019-01-07 DIAGNOSIS — E66.01 MORBID OBESITY (H): ICD-10-CM

## 2019-01-07 DIAGNOSIS — Z00.00 INITIAL MEDICARE ANNUAL WELLNESS VISIT: Primary | ICD-10-CM

## 2019-01-07 DIAGNOSIS — I10 ESSENTIAL HYPERTENSION: ICD-10-CM

## 2019-01-07 DIAGNOSIS — F41.9 ANXIETY: ICD-10-CM

## 2019-01-07 LAB
ALBUMIN SERPL-MCNC: 3.8 G/DL (ref 3.4–5)
ANION GAP SERPL CALCULATED.3IONS-SCNC: 10 MMOL/L (ref 3–14)
BUN SERPL-MCNC: 15 MG/DL (ref 7–30)
CALCIUM SERPL-MCNC: 8.9 MG/DL (ref 8.5–10.1)
CHLORIDE SERPL-SCNC: 105 MMOL/L (ref 94–109)
CHOLEST SERPL-MCNC: 139 MG/DL
CO2 SERPL-SCNC: 23 MMOL/L (ref 20–32)
CREAT SERPL-MCNC: 0.8 MG/DL (ref 0.66–1.25)
GFR SERPL CREATININE-BSD FRML MDRD: >90 ML/MIN/{1.73_M2}
GLUCOSE SERPL-MCNC: 111 MG/DL (ref 70–99)
HDLC SERPL-MCNC: 41 MG/DL
LDLC SERPL CALC-MCNC: 70 MG/DL
NONHDLC SERPL-MCNC: 98 MG/DL
PHOSPHATE SERPL-MCNC: 3 MG/DL (ref 2.5–4.5)
POTASSIUM SERPL-SCNC: 3.8 MMOL/L (ref 3.4–5.3)
SODIUM SERPL-SCNC: 138 MMOL/L (ref 133–144)
TRIGL SERPL-MCNC: 142 MG/DL

## 2019-01-07 PROCEDURE — 80061 LIPID PANEL: CPT | Performed by: FAMILY MEDICINE

## 2019-01-07 PROCEDURE — G0009 ADMIN PNEUMOCOCCAL VACCINE: HCPCS | Performed by: FAMILY MEDICINE

## 2019-01-07 PROCEDURE — 36415 COLL VENOUS BLD VENIPUNCTURE: CPT | Performed by: FAMILY MEDICINE

## 2019-01-07 PROCEDURE — 80069 RENAL FUNCTION PANEL: CPT | Performed by: FAMILY MEDICINE

## 2019-01-07 PROCEDURE — 90670 PCV13 VACCINE IM: CPT | Performed by: FAMILY MEDICINE

## 2019-01-07 PROCEDURE — G0402 INITIAL PREVENTIVE EXAM: HCPCS | Performed by: FAMILY MEDICINE

## 2019-01-07 RX ORDER — CITALOPRAM HYDROBROMIDE 20 MG/1
20 TABLET ORAL DAILY
Qty: 90 TABLET | Refills: 3 | Status: SHIPPED | OUTPATIENT
Start: 2019-01-07 | End: 2019-04-22

## 2019-01-07 RX ORDER — SIMVASTATIN 20 MG
20 TABLET ORAL AT BEDTIME
Qty: 90 TABLET | Refills: 3 | Status: SHIPPED | OUTPATIENT
Start: 2019-01-07 | End: 2019-04-22

## 2019-01-07 RX ORDER — LISINOPRIL AND HYDROCHLOROTHIAZIDE 12.5; 2 MG/1; MG/1
TABLET ORAL
Qty: 180 TABLET | Refills: 11 | Status: SHIPPED | OUTPATIENT
Start: 2019-01-07 | End: 2019-04-22

## 2019-01-07 ASSESSMENT — ACTIVITIES OF DAILY LIVING (ADL): CURRENT_FUNCTION: NO ASSISTANCE NEEDED

## 2019-01-07 ASSESSMENT — MIFFLIN-ST. JEOR: SCORE: 1950.76

## 2019-01-07 NOTE — PROGRESS NOTES
"SUBJECTIVE:   Jarad Javier is a 63 year old male who presents for Preventive Visit.    Are you in the first 12 months of your Medicare coverage? Yes, with glasses - Visual Acuity:  Right Eye: 20/16   Left Eye: 20/20  Both Eyes: 20/16    Annual Wellness Visit     In general, how would you rate your overall health?  Good    Frequency of exercise:  None    Do you usually eat at least 4 servings of fruit and vegetables a day, include whole grains    & fiber and avoid regularly eating high fat or \"junk\" foods?  No    Taking medications regularly:  Yes    Medication side effects:  None    Ability to successfully perform activities of daily living:  No assistance needed    Home Safety:  No safety concerns identified    Hearing Impairment:  No hearing concerns    In the past 6 months, have you been bothered by leaking of urine?  No    In general, how would you rate your overall mental or emotional health?  Excellent    PHQ-2 Total Score: 0    Additional concerns today:  No    Do you feel safe in your environment? Yes    Do you have a Health Care Directive? Yes: Patient states has Advance Directive and will bring in a copy to clinic.      Fall risk       Cognitive Screening   1) Repeat 3 items (Leader, Season, Table)    2) Clock draw: NORMAL  3) 3 item recall: Recalls 3 objects  Results: 3 items recalled: COGNITIVE IMPAIRMENT LESS LIKELY    Mini-CogTM Copyright S Prabhjot. Licensed by the author for use in [facility]; reprinted with permission (parish@.[facility]). All rights reserved.      Do you have sleep apnea, excessive snoring or daytime drowsiness?: yes according to wife     Reviewed and updated as needed this visit by clinical staff  Tobacco  Allergies  Meds         Reviewed and updated as needed this visit by Provider        Social History     Tobacco Use     Smoking status: Never Smoker     Smokeless tobacco: Never Used   Substance Use Topics     Alcohol use: Yes     Comment: Very occasional "       Alcohol Use 1/7/2019   If you drink alcohol do you typically have greater than 3 drinks per day OR greater than 7 drinks per week? No   No flowsheet data found.          Has hypertension  BP at home  Usual values at home 125-130s  Tends to be higher in the clinic  BP Readings from Last 6 Encounters:   01/07/19 134/89   10/09/18 129/81   06/04/18 128/78   10/06/17 132/82   08/28/17 148/70   08/23/17 154/83         Current providers sharing in care for this patient include:   Patient Care Team:  Edilson Kim MD as PCP - General (Family Practice)  Edilson Kim MD as PCP - Assigned PCP    The following health maintenance items are reviewed in Epic and correct as of today:  Health Maintenance   Topic Date Due     HIV SCREEN (SYSTEM ASSIGNED)  10/19/1971     HEPATITIS C SCREENING  10/19/1971     ADVANCE DIRECTIVE PLANNING Q5 YRS  10/19/2008     ZOSTER IMMUNIZATION (2 of 3) 12/01/2017     INFLUENZA VACCINE (1) 09/01/2018     PHQ-2 Q1 YR  10/06/2018     FALL RISK ASSESSMENT  10/19/2018     PNEUMOVAX IMMUNIZATION 65+ LOW/MEDIUM RISK (1 of 2 - PCV13) 10/19/2018     LIPID SCREEN Q5 YR MALE (SYSTEM ASSIGNED)  08/14/2022     DTAP/TDAP/TD IMMUNIZATION (2 - Td) 05/16/2023     COLON CANCER SCREEN (SYSTEM ASSIGNED)  10/09/2028     AORTIC ANEURYSM SCREENING (SYSTEM ASSIGNED)  Completed     IPV IMMUNIZATION  Aged Out     MENINGITIS IMMUNIZATION  Aged Out     Labs reviewed in EPIC  BP Readings from Last 3 Encounters:   01/07/19 134/89   10/09/18 129/81   06/04/18 128/78    Wt Readings from Last 3 Encounters:   01/07/19 117.9 kg (260 lb)   10/09/18 113.4 kg (250 lb)   06/04/18 119.2 kg (262 lb 12.8 oz)                  Patient Active Problem List   Diagnosis     Allergic rhinitis     Anxiety     Chronic rhinitis     Essential hypertension     Screen for colon cancer     Dysfunction of eustachian tube     Dysphonia     Obesity (BMI 35.0-39.9) with comorbidity (H)     Past Surgical History:   Procedure  Laterality Date     ADENOIDECTOMY  1960's    When I was about 8     COLONOSCOPY       TONSILLECTOMY  1960's    When I was about 8       Social History     Tobacco Use     Smoking status: Never Smoker     Smokeless tobacco: Never Used   Substance Use Topics     Alcohol use: Yes     Comment: Very occasional     Family History   Problem Relation Age of Onset     Hypertension Mother      Asthma Mother      Glaucoma No family hx of      Macular Degeneration No family hx of      Diabetes No family hx of          Current Outpatient Medications   Medication Sig Dispense Refill     cholecalciferol (VITAMIN D3) 5000 UNITS CAPS capsule Take 5,000 Units by mouth       citalopram (CELEXA) 20 MG tablet TAKE 1 TABLET BY MOUTH DAILY 30 tablet 0     coenzyme Q-10 10 MG CAPS        fluticasone (FLONASE) 50 MCG/ACT spray Spray 1 spray into both nostrils daily       ibuprofen (ADVIL/MOTRIN) 800 MG tablet Take 800 mg by mouth       lisinopril-hydrochlorothiazide (PRINZIDE/ZESTORETIC) 20-12.5 MG per tablet TAKE 2 TABLETS BY MOUTH DAILY 180 tablet 0     Multiple Vitamins-Minerals (MULTIVITAMIN ADULT PO) Focus factor, multivitamin, takes daily       omeprazole (PRILOSEC) 20 MG CR capsule Take 20 mg by mouth       simvastatin (ZOCOR) 20 MG tablet TAKE 1 TABLET BY MOUTH AT BEDTIME 30 tablet 0     No Known Allergies  Pneumonia Vaccine:Adults age 65+ who have not received previous Pneumovax (PPSV23) or PCV13 as an adult: Should first be given PCV13 AND then should be given PPSV23 6-12 months after PCV13    Review of Systems    Constitutional: no recent illness, no fevers/sweats/chills  Eyes: No vision changes or eye irritation  Ears/Nose/Throat: No runny nose, sore throat or ear pain  CV: no palpitations, no chest pain, no lower extremity swelling.  Resp: no shortness of breath, wheezing, or cough.  GI: no nausea/vomiting/diarrhea, no black or bloody stools  : no burning or urgency with urination  Skin: no rash  Musculoskeletal: no joint  "pain, muscle pain, weakness  Psych: no depression, no concerns about anxiety  Neuro: no new headaches, dizziness, numbness, or tingling      OBJECTIVE:   /89   Pulse 76   Temp 97.8  F (36.6  C) (Oral)   Resp 16   Ht 1.746 m (5' 8.75\")   Wt 117.9 kg (260 lb)   SpO2 99%   BMI 38.68 kg/m   Estimated body mass index is 38.68 kg/m  as calculated from the following:    Height as of this encounter: 1.746 m (5' 8.75\").    Weight as of this encounter: 117.9 kg (260 lb).  Physical Exam    General Appearance: Pleasant, alert, in no acute respiratory distress.  Head Exam: Normal. Normocephalic, atraumatic. No sinus tenderness.  Eye Exam: Normal external eye, conjunctiva, lids. JAVED.  Ear Exam: Normal auditory canals and external ears. Non-tender.  Left TM-normal. Right TM-normal.  OroPharynx Exam: Dental hygiene adequate. Normal buccal mucosa. Normal pharynx.  Neck Exam: Supple, no masses or enlarged, tender nodes.  Thyroid Exam: No nodules or enlargement or tenderness.  Chest/Respiratory Exam: Normal, comfortable, easy respirations. Chest wall normal. Lungs are clear to auscultation. No wheezing, crackles, or rhonchi.  Cardiovascular Exam: Regular rate and rhythm. No murmur, gallop, or rubs. No pedal edema.  Gastrointestinal Exam: Soft, non-tender, no masses or organomegaly.  Musculoskeletal Exam: Back is non-tender, full ROM of upper and lower extremities.  Skin: no rash, warm and dry.    Neurologic Exam: Nonfocal, no tremor. Normal gait.  Psychiatric Exam: Alert - appropriate, normal affect      ASSESSMENT / PLAN:       ICD-10-CM    1. Initial Medicare annual wellness visit Z00.00    2. Anxiety F41.9    3. Morbid obesity (H) E66.01    4. Essential hypertension I10    5. At risk for heart disease Z91.89    6. Need for vaccination Z23        End of Life Planning:  Patient currently has an advanced directive: Yes.  Practitioner is supportive of decision.    COUNSELING:  Reviewed preventive health counseling, as " "reflected in patient instructions       Regular exercise       Healthy diet/nutrition       Vision screening       Hearing screening       Dental care       Immunizations    Vaccinated for: Pneumococcal             Colon cancer screening       Prostate cancer screening    BP Readings from Last 1 Encounters:   01/07/19 134/89     Estimated body mass index is 38.68 kg/m  as calculated from the following:    Height as of this encounter: 1.746 m (5' 8.75\").    Weight as of this encounter: 117.9 kg (260 lb).      Weight management plan: Discussed healthy diet and exercise guidelines     reports that  has never smoked. he has never used smokeless tobacco.      Appropriate preventive services were discussed with this patient, including applicable screening as appropriate for cardiovascular disease, diabetes, osteopenia/osteoporosis, and glaucoma.  As appropriate for age/gender, discussed screening for colorectal cancer, prostate cancer, breast cancer, and cervical cancer. Checklist reviewing preventive services available has been given to the patient.    Reviewed patients plan of care and provided an AVS. The Intermediate Care Plan   for Jarad meets the Care Plan requirement. This Care Plan has been established and reviewed with the Patient.    Counseling Resources:  ATP IV Guidelines  Pooled Cohorts Equation Calculator  Breast Cancer Risk Calculator  FRAX Risk Assessment  ICSI Preventive Guidelines  Dietary Guidelines for Americans, 2010  [employer] MyPlate  ASA Prophylaxis  Lung CA Screening    Edilson Gilson Kim MD  [facility]  "

## 2019-01-07 NOTE — NURSING NOTE
"Chief Complaint   Patient presents with     Wellness Visit     /89   Pulse 76   Temp 97.8  F (36.6  C) (Oral)   Resp 16   Ht 1.746 m (5' 8.75\")   Wt 117.9 kg (260 lb)   SpO2 99%   BMI 38.68 kg/m   Estimated body mass index is 38.68 kg/m  as calculated from the following:    Height as of this encounter: 1.746 m (5' 8.75\").    Weight as of this encounter: 117.9 kg (260 lb).  bp completed using cuff size: large       Health Maintenance addressed:  NONE    n/a    Ana Rosa Nur MA     "

## 2019-01-07 NOTE — PATIENT INSTRUCTIONS
Preventive Health Recommendations:     See your health care provider every year to    Review health changes.     Discuss preventive care.      Review your medicines if your doctor has prescribed any.    Talk with your health care provider about whether you should have a test to screen for prostate cancer (PSA).    Every 3 years, have a diabetes test (fasting glucose). If you are at risk for diabetes, you should have this test more often.    Every 5 years, have a cholesterol test. Have this test more often if you are at risk for high cholesterol or heart disease.     Every 10 years, have a colonoscopy. Or, have a yearly FIT test (stool test). These exams will check for colon cancer.    Talk to with your health care provider about screening for Abdominal Aortic Aneurysm if you have a family history of AAA or have a history of smoking.  Shots:     Get a flu shot each year.     Get a tetanus shot every 10 years.     Talk to your doctor about your pneumonia vaccines. There are now two you should receive - Pneumovax (PPSV 23) and Prevnar (PCV 13).    Talk to your pharmacist about a shingles vaccine.     Talk to your doctor about the hepatitis B vaccine.  Nutrition:     Eat at least 5 servings of fruits and vegetables each day.     Eat whole-grain bread, whole-wheat pasta and brown rice instead of white grains and rice.     Get adequate Calcium and Vitamin D.   Lifestyle    Exercise for at least 150 minutes a week (30 minutes a day, 5 days a week). This will help you control your weight and prevent disease.     Limit alcohol to one drink per day.     No smoking.     Wear sunscreen to prevent skin cancer.     See your dentist every six months for an exam and cleaning.     See your eye doctor every 1 to 2 years to screen for conditions such as glaucoma, macular degeneration and cataracts.    Personalized Prevention Plan  You are due for the preventive services outlined below.  Your care team is available to assist you in  scheduling these services.  If you have already completed any of these items, please share that information with your care team to update in your medical record.    Health Maintenance Due   Topic Date Due     HIV SCREEN (SYSTEM ASSIGNED)  10/19/1971     Hepatitis C Screening  10/19/1971     Discuss Advance Directive Planning  10/19/2008     Zoster (Chicken Pox) Vaccine (2 of 3) 12/01/2017     Flu Vaccine (1) 09/01/2018     Depression Assessment 2 - yearly  10/06/2018     FALL RISK ASSESSMENT  10/19/2018     Pneumococcal Vaccine (1 of 2 - PCV13) 10/19/2018

## 2019-02-08 ENCOUNTER — APPOINTMENT (OUTPATIENT)
Dept: GENERAL RADIOLOGY | Facility: CLINIC | Age: 64
End: 2019-02-08
Attending: EMERGENCY MEDICINE
Payer: MEDICARE

## 2019-02-08 ENCOUNTER — HOSPITAL ENCOUNTER (EMERGENCY)
Facility: CLINIC | Age: 64
Discharge: HOME | End: 2019-02-08
Attending: EMERGENCY MEDICINE | Admitting: EMERGENCY MEDICINE
Payer: MEDICARE

## 2019-02-08 VITALS
SYSTOLIC BLOOD PRESSURE: 118 MMHG | TEMPERATURE: 97.8 F | DIASTOLIC BLOOD PRESSURE: 67 MMHG | WEIGHT: 260 LBS | HEART RATE: 77 BPM | RESPIRATION RATE: 14 BRPM | OXYGEN SATURATION: 98 % | HEIGHT: 70 IN | BODY MASS INDEX: 37.22 KG/M2

## 2019-02-08 DIAGNOSIS — S43.101A SEPARATION OF RIGHT ACROMIOCLAVICULAR JOINT, TYPE 2, INITIAL ENCOUNTER: ICD-10-CM
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
## 2019-02-08 DIAGNOSIS — S49.91XA SHOULDER INJURY, RIGHT, INITIAL ENCOUNTER: ICD-10-CM

## 2019-02-08 PROCEDURE — 71046 X-RAY EXAM CHEST 2 VIEWS: CPT

## 2019-02-08 PROCEDURE — 73030 X-RAY EXAM OF SHOULDER: CPT | Mod: RT

## 2019-02-08 PROCEDURE — 99284 EMERGENCY DEPT VISIT MOD MDM: CPT | Mod: 25

## 2019-02-08 RX ORDER — IBUPROFEN 600 MG/1
600 TABLET, FILM COATED ORAL EVERY 8 HOURS PRN
Qty: 30 TABLET | Refills: 0 | Status: SHIPPED | OUTPATIENT
Start: 2019-02-08 | End: 2019-03-10

## 2019-02-08 ASSESSMENT — MIFFLIN-ST. JEOR: SCORE: 1970.6

## 2019-02-08 ASSESSMENT — ENCOUNTER SYMPTOMS
ABDOMINAL PAIN: 0
SHORTNESS OF BREATH: 0
NAUSEA: 0
FEVER: 0
NECK PAIN: 0
VOMITING: 0

## 2019-02-08 NOTE — ED AVS SNAPSHOT
Emergency Department  [address] [place] [id number]  Phone:  [phone]  Fax:  [phone]                                    Jarad Javier   MRN: [id number]    Department:   Emergency Department   Date of Visit:  2/8/2019           After Visit Summary Signature Page    I have received my discharge instructions, and my questions have been answered. I have discussed any challenges I see with this plan with the nurse or doctor.    ..........................................................................................................................................  Patient/Patient Representative Signature      ..........................................................................................................................................  Patient Representative Print Name and Relationship to Patient    ..................................................               ................................................  Date                                   Time    ..........................................................................................................................................  Reviewed by Signature/Title    ...................................................              ..............................................  Date                                               Time          22EPIC Rev 08/18

## 2019-02-08 NOTE — ED PROVIDER NOTES
History     Chief Complaint:  Shoulder Pain    HPI   Jarad Javier is a 63 year old male who presents with shoulder pain. The patient slipped and fell on some ice, landing on his right side. He has right shoulder pain. He states that when he raises his right arm, there is a bump that protrudes on his shoulder. He denies hitting his head or any loss of consciousness. At the time of the fall he thinks he got the breath knocked out of him but he denies any breathing troubles now. He denies any abdominal pain, neck pain, headache, nausea, and vomiting. The patient is right handed. He denies any numbness, tingling, or weakness. Pain in the shoulder is increased when trying to raise the arm and he feels weakness at the shoulder when raising his arm above the level of the shoulder.     Allergies:  No known drug allergies.    Medications:    Cholecalciferol (vitamin d3) 5000 units caps capsule  Citalopram (celexa) 20 mg tablet  Coenzyme q-10 10 mg caps  Fluticasone (flonase) 50 mcg/act spray  Ibuprofen (advil/motrin) 800 mg tablet  Lisinopril-hydrochlorothiazide (prinzide/zestoretic) 20-12.5 mg tablet  Simvastatin (zocor) 20 mg tablet     Past Medical History:    Anxiety  Arthritis  Gastroesophageal reflux disease  Hypertension  Obesity  Dysphonia  Chronic rhinitis  Dysfunction of eustachian tube    Past Surgical History:    Adenoidectomy  Tonsillectomy    Family History:    Hypertension  Asthma    Social History:  Patient is   Tobacco Use: No  Alcohol Use: Yes  PCP: Edilson Kim     Review of Systems   Constitutional: Negative for fever.   Respiratory: Negative for shortness of breath.    Gastrointestinal: Negative for abdominal pain, nausea and vomiting.   Musculoskeletal: Negative for neck pain.        Right shoulder pain   Neurological:        No LOC   All other systems reviewed and are negative.    Physical Exam   First Vitals:  Patient Vitals for the past 24 hrs:   BP Temp Temp src Pulse Resp  "SpO2 Height Weight   02/08/19 1533 -- -- -- -- 14 -- -- --   02/08/19 1321 118/67 97.8  F (36.6  C) Oral 77 16 98 % 1.778 m (5' 10\") 117.9 kg (260 lb)     Physical Exam  General: Well appearing, nontoxic. Resting comfortably  Head:  Scalp, face, and head appear normal, atraumatic   Eyes:  Pupils equal, round    Conjunctivae noninjected and sclera white  ENT:    The nose is normal    Ears/pinnae are normal  Neck:  Normal range of motion. Cervical spine nontender, no stepoffs  CV:  RRR, no M/R/G. Radial pulses 2+ bilaterally   Resp:  CTAB, no increased WOB   MSK:  Focal tenderness to palpation over the right AC joint. Clavicle normal. When the right arm is flexed at the shoulder there is bulging of the distal clavicle at the AC joint. The remainder of the shoulder, arm, and scapula are non tender to palpation. No significant joint effusion. No wounds. Full PROM of the shoulder. Pain increased with active flexion. The remainder of the right upper extremity and left upper extremity are normal. No other injuries.   Skin:  No rash or lesions noted.  Neuro: Speech is normal and fluent.  strength 5/5 bilaterally. SILT in all peripheral distributions of the hands bilaterally as well as over the lateral deltoid.    Moves all extremities spontaneously  Psych:  Awake, Alert. Normal affect      Appropriate interactions           Emergency Department Course     Imaging:  Radiographic findings were communicated with the patient who voiced understanding of the findings.  XR chest 2 views:  No acute disease per radiology read.  XR shoulder right 3 views:  No fracture or dislocation in the RIGHT shoulder. Mild  degenerative changes in the acromioclavicular joint, but the joint  remains in normal alignment. No significant soft tissue swelling. Per radiology read.    Emergency Department Course:  1:27 PM Nursing notes and vitals reviewed.  I performed an exam of the patient as documented above.   3:25 PM I rechecked on and updated " the patient.  3:29 PM Findings and plan explained to the patient. Patient discharged home with instructions regarding supportive care, medications, and reasons to return. The importance of close follow-up was reviewed.     Impression & Plan       Medical Decision Making:  Jarad Javier is a 63 year old male presents for evaluation after slipping and falling on the ice, injuring his right shoulder.  Signs and symptoms are consistent with a contusion and injury to the right AC joint with likely partial AC separation.  A broad differential was considered including sprain, strain, fracture, tendon rupture, nerve impingement/compromise, referred pain. XR of the shoulder and chest do not reveal and significant bony injury. No pleural effusion or ptx. No other acute findings. Supportive outpatient management is indicated. Patient provided with sling for comfort. I recommended gentle ROM exercises of the shoulder. We discussed that he will likely need physical therapy for strengthening and healing of the AC joint. I have recommended follow up with orthopedics in 1 week for reassessment of the AC joint injury. Neurovasculature intact. No other significant traumatic injuries. Return precautions were discussed with patient. The patient's questions were answered and the patient was agreeable with discharge.     Diagnosis:    ICD-10-CM    1. Fall due to slipping on ice or snow, initial encounter W00.9XXA    2. Separation of right acromioclavicular joint, type 2, initial encounter S43.101A    3. Shoulder injury, right, initial encounter S49.91XA         Disposition:  discharged to home    Discharge Medications:     Medication List      Modified    ibuprofen 600 MG tablet  Commonly known as:  ADVIL/MOTRIN  600 mg, Oral, EVERY 8 HOURS PRN  What changed:      medication strength    how much to take    when to take this    reasons to take this          I, Bradley Aasen, am serving as a scribe on 2/8/2019 at 1:26 PM to  personally document services performed by Brodie Whiteside MD based on my observations and the provider's statements to me.          Brodie Whiteside MD  02/09/19 [id number]

## 2019-02-18 ENCOUNTER — TRANSFERRED RECORDS (OUTPATIENT)
Dept: HEALTH INFORMATION MANAGEMENT | Facility: CLINIC | Age: 64
End: 2019-02-18

## 2019-04-04 ENCOUNTER — OFFICE VISIT (OUTPATIENT)
Dept: FAMILY MEDICINE | Facility: CLINIC | Age: 64
End: 2019-04-04
Payer: MEDICARE

## 2019-04-04 VITALS — DIASTOLIC BLOOD PRESSURE: 78 MMHG | SYSTOLIC BLOOD PRESSURE: 130 MMHG

## 2019-04-04 DIAGNOSIS — D22.9 MULTIPLE BENIGN NEVI: ICD-10-CM

## 2019-04-04 DIAGNOSIS — L82.1 SEBORRHEIC KERATOSES: ICD-10-CM

## 2019-04-04 DIAGNOSIS — L81.9 HYPERPIGMENTATION: ICD-10-CM

## 2019-04-04 DIAGNOSIS — L73.8 SEBACEOUS GLAND HYPERPLASIA: ICD-10-CM

## 2019-04-04 DIAGNOSIS — L81.4 LENTIGINES: ICD-10-CM

## 2019-04-04 DIAGNOSIS — D48.5 NEOPLASM OF UNCERTAIN BEHAVIOR OF SKIN: Primary | ICD-10-CM

## 2019-04-04 DIAGNOSIS — L81.8 IDIOPATHIC GUTTATE HYPOMELANOSIS: ICD-10-CM

## 2019-04-04 DIAGNOSIS — D18.01 CHERRY ANGIOMA: ICD-10-CM

## 2019-04-04 PROCEDURE — 11104 PUNCH BX SKIN SINGLE LESION: CPT | Performed by: PHYSICIAN ASSISTANT

## 2019-04-04 PROCEDURE — 88305 TISSUE EXAM BY PATHOLOGIST: CPT | Mod: TC | Performed by: PHYSICIAN ASSISTANT

## 2019-04-04 PROCEDURE — 99214 OFFICE O/P EST MOD 30 MIN: CPT | Mod: 25 | Performed by: PHYSICIAN ASSISTANT

## 2019-04-04 NOTE — LETTER
4/4/2019         RE: Jarad Javier  [address]  [facility] [address]        Dear Colleague,    Thank you for referring your patient, Jarad Javier, to the [facility]. Please see a copy of my visit note below.    HPI:  I was asked to see pt by Dr. Kim. Jarad Javier is a 63 year old male patient here today for under right eye .  Patient states this has been present for 1 year.  Patient reports the following symptoms: crusty .  Patient reports the following previous treatments: none.  Patient reports the following modifying factors: none.  Associated symptoms: none.  Patient has no other skin complaints today.  Remainder of the HPI, Meds, PMH, Allergies, FH, and SH was reviewed in chart.    Pertinent Hx:   No personal or family history of skin cancer    Past Medical History:   Diagnosis Date     Anxiety 2013    I take Citalopram     Arthritis 2002    Some in hands but mostly feet, big toe joints     Gastroesophageal reflux disease 2012?    Take antacids when needed     Hypertension 1970's    Borderline high to high, white coat syndrome       Past Surgical History:   Procedure Laterality Date     ADENOIDECTOMY  1960's    When I was about 8     COLONOSCOPY       TONSILLECTOMY  1960's    When I was about 8        Family History   Problem Relation Age of Onset     Hypertension Mother      Asthma Mother      Glaucoma No family hx of      Macular Degeneration No family hx of      Diabetes No family hx of        Social History     Socioeconomic History     Marital status:      Spouse name: Not on file     Number of children: Not on file     Years of education: Not on file     Highest education level: Not on file   Occupational History     Not on file   Social Needs     Financial resource strain: Not on file     Food insecurity:     Worry: Not on file     Inability: Not on file     Transportation needs:     Medical: Not on file     Non-medical: Not on  file   Tobacco Use     Smoking status: Never Smoker     Smokeless tobacco: Never Used   Substance and Sexual Activity     Alcohol use: Yes     Comment: Very occasional     Drug use: No     Sexual activity: Not Currently     Partners: Male     Birth control/protection: None   Lifestyle     Physical activity:     Days per week: Not on file     Minutes per session: Not on file     Stress: Not on file   Relationships     Social connections:     Talks on phone: Not on file     Gets together: Not on file     Attends Protestant service: Not on file     Active member of club or organization: Not on file     Attends meetings of clubs or organizations: Not on file     Relationship status: Not on file     Intimate partner violence:     Fear of current or ex partner: Not on file     Emotionally abused: Not on file     Physically abused: Not on file     Forced sexual activity: Not on file   Other Topics Concern     Parent/sibling w/ CABG, MI or angioplasty before 65F 55M? Not Asked   Social History Narrative     Not on file       Outpatient Encounter Medications as of 2019   Medication Sig Dispense Refill     cholecalciferol (VITAMIN D3) 5000 UNITS CAPS capsule Take 5,000 Units by mouth       citalopram (CELEXA) 20 MG tablet Take 1 tablet (20 mg) by mouth daily 90 tablet 3     coenzyme Q-10 10 MG CAPS        fluticasone (FLONASE) 50 MCG/ACT spray Spray 1 spray into both nostrils daily       lisinopril-hydrochlorothiazide (PRINZIDE/ZESTORETIC) 20-12.5 MG tablet TAKE 2 TABLETS BY MOUTH DAILY 180 tablet 11     simvastatin (ZOCOR) 20 MG tablet Take 1 tablet (20 mg) by mouth At Bedtime 90 tablet 3     [] ibuprofen (ADVIL/MOTRIN) 600 MG tablet Take 1 tablet (600 mg) by mouth every 8 hours as needed for pain 30 tablet 0     No facility-administered encounter medications on file as of 2019.        Review Of Systems:  Skin: As above  Eyes: negative  Ears/Nose/Throat: negative  Respiratory: No shortness of breath, dyspnea on  exertion, cough, or hemoptysis  Cardiovascular: negative  Gastrointestinal: negative  Genitourinary: negative  Musculoskeletal: negative  Neurologic: negative  Psychiatric: negative  Hematologic/Lymphatic/Immunologic: negative  Endocrine: negative      Objective:     /78   Eyes: Conjunctivae/lids: Normal   ENT: Lips:  Normal  MSK: Normal  Cardiovascular: Peripheral edema none  Pulm: Breathing Normal  Neuro/Psych: Orientation: Normal; Mood/Affect: Normal, NAD, WDWN  Pt accompanied by: self  Following areas examined: face, neck, hands, forearms  Gore skin type:ii   Findings:  Garduno WD smooth macules on forearms  Well circumscribed macules with symmetric color distribution on forearms 2mm  Hypopigmented smooth macules on ventral forearms   Flesh colored papule/s with yellow lobules and central depression on forehead  Red smooth well-defined macules on face  hemecrusted macule with surrounding blanchable red macules on right inferior eyelid 0.2cm  Brown, stuck-on scaly appearing papules left cheek  Hyperpigmentation of undereye area      Assessment and Plan:  1) Lentigines, multiple benign nevi, cherry angiomas, IGH, SGH,  and seborrheic keratoses    Treatment of these lesions would be purely cosmetic and not medically neccessary.  Lesion may recur and/or may not completely resolve. May need additional treatment.  Different removal options including excision, cryotherapy, cautery and /or laser.      2) Neoplasm of uncertain behavior on right inferior eyelid 0.2cm  SCC vs BCC vs ISK  TANGENTIAL BIOPSY:  After consent, anesthesia with LEC and prep, tangential biopsy performed.  No complications and routine wound care.  May grow back and will get a scar. Based on lesion type may need to completely remove lesion. Patient will be notified in 7-10 days of results. Wound care directions given.    3) undereye bags/hyperpigmentation  Treat allergies   Drink plenty of water  Get plenty of rest  Consider Teamine eye  cream  Consider surgery    Signs and Symptoms of non-melanoma skin cancer and ABCDEs of melanoma rUV precautions reviewed with patient. Patient was asked about new or changing moles/lesions on body.   Wear a sunscreen with at least SPF 30 on your face, ears, neck and V of the chest daily. Wear sunscreen on other areas of the body if those areas are exposed to the sun throughout the day. Sunscreens can contain physical and/or chemical blockers. Physical blockers are less likely to clog pores, these include zinc oxide and titanium dioxide. Reapply every two hour and after swimming. Sunscreen examples include Neutrogena, CeraVe, Blue Lizard, Elta MD and many others.            Follow up in yearly FBE      Again, thank you for allowing me to participate in the care of your patient.        Sincerely,        Yamel Whitlock PA-C

## 2019-04-04 NOTE — PATIENT INSTRUCTIONS
WOUND CARE INSTRUCTIONS  FOR CRYOSURGERY        This area treated with liquid nitrogen will form a blister. You do not need to bandage the area until after the blister forms and breaks (which may be a few days).  When the blister breaks, begin daily dressing changes as follows:    1) Clean and dry the area with tap water using clean Q-tip or sterile gauze pad.    2) Apply Polysporin ointment or Bacitracin ointment over entire wound.  Do NOT use Neosporin ointment.    3) Cover the wound with a band-aid or sterile non-stick gauze pad and micropore paper tape.      REPEAT THESE INSTRUCTIONS AT LEAST ONCE A DAY UNTIL THE WOUND HAS COMPLETELY HEALED.        It is an old wives tale that a wound heals better when it is exposed to air and allowed to dry out. The wound will heal faster with a better cosmetic result if it is kept moist with ointment and covered with a bandage.  Do not let the wound dry out.      Supplies Needed:     *Cotton tipped applicators (Q-tips)   *Polysporin ointment or Bacitracin ointment (NOT NEOSPORIN)   *Band-aids, or non stick gauze pads and micropore paper tape    PATIENT INFORMATION    During the healing process you will notice a number of changes. All wounds develop a small halo of redness surrounding the wound.  This means healing is occurring. Severe itching with extensive redness usually indicates sensitivity to the ointment or bandage tape used to dress the wound.  You should call our office if this develops.      Swelling and/or discoloration around your surgical site is common, particularly when performed around the eye.    All wounds normally drain.  The larger the wound the more drainage there will be.  After 7-10 days, you will notice the wound beginning to shrink and new skin will begin to grow.  The wound is healed when you can see skin has formed over the entire area.  A healed wound has a healthy, shiny look to the surface and is red to dark pink in color to normalize.  Wounds may  take approximately 4-6 weeks to heal.  Larger wounds may take 6-8 weeks.  After the wound is healed you may discontinue dressing changes.    You may experience a sensation of tightness as your wound heals. This is normal and will gradually subside.    Your healed wound may be sensitive to temperature changes. This sensitivity improves with time, but if you re having a lot of discomfort, try to avoid temperature extremes.    Patients frequently experience itching after their wound appears to have healed because of the continue healing under the skin.  Plain Vaseline will help relieve the itching.                      Wound Care Instructions     FOR SUPERFICIAL WOUNDS     [facility] [phone]    [facility] [phone]          AFTER 24 HOURS YOU SHOULD REMOVE THE BANDAGE AND BEGIN DAILY DRESSING CHANGES AS FOLLOWS:     1) Remove Dressing.     2) Clean and dry the area with tap water using a Q-tip or sterile gauze pad.     3) Apply Vaseline, Aquaphor, Polysporin ointment or Bacitracin ointment over entire wound.  Do NOT use Neosporin ointment.     4) Cover the wound with a band-aid, or a sterile non-stick gauze pad and micropore paper tape      REPEAT THESE INSTRUCTIONS AT LEAST ONCE A DAY UNTIL THE WOUND HAS COMPLETELY HEALED.    It is an old wives tale that a wound heals better when it is exposed to air and allowed to dry out. The wound will heal faster with a better cosmetic result if it is kept moist with ointment and covered with a bandage.    **Do not let the wound dry out.**      Supplies Needed:      *Cotton tipped applicators (Q-tips)    *Polysporin Ointment or Bacitracin Ointment (NOT NEOSPORIN)    *Band-aids or non-stick gauze pads and micropore paper tape.      PATIENT INFORMATION:    During the healing process you will notice a number of changes. All wounds develop a small halo of redness surrounding the wound.  This means healing is occurring. Severe itching with extensive  redness usually indicates sensitivity to the ointment or bandage tape used to dress the wound.  You should call our office if this develops.      Swelling  and/or discoloration around your surgical site is common, particularly when performed around the eye.    All wounds normally drain.  The larger the wound the more drainage there will be.  After 7-10 days, you will notice the wound beginning to shrink and new skin will begin to grow.  The wound is healed when you can see skin has formed over the entire area.  A healed wound has a healthy, shiny look to the surface and is red to dark pink in color to normalize.  Wounds may take approximately 4-6 weeks to heal.  Larger wounds may take 6-8 weeks.  After the wound is healed you may discontinue dressing changes.    You may experience a sensation of tightness as your wound heals. This is normal and will gradually subside.    Your healed wound may be sensitive to temperature changes. This sensitivity improves with time, but if you re having a lot of discomfort, try to avoid temperature extremes.    Patients frequently experience itching after their wound appears to have healed because of the continue healing under the skin.  Plain Vaseline will help relieve the itching.        POSSIBLE COMPLICATIONS    BLEEDIN. Leave the bandage in place.  2. Use tightly rolled up gauze or a cloth to apply direct pressure over the bandage for 30  minutes.  3. Reapply pressure for an additional 30 minutes if necessary  4. Use additional gauze and tape to maintain pressure once the bleeding has stopped.    Proper skin care from [facility]:    -Eliminate harsh soaps as they strip the natural oils from the skin, often resulting in dry itchy skin ( i.e. Dial, Zest, Dinorah Spring)  -Use mild soaps such as Cetaphil or Dove Sensitive Skin in the shower. You do not need to use soap on arms, legs, and trunk every time you shower unless visibly soiled.   -Avoid hot or cold  showers.  -After showering, lightly dry off and apply moisturizing within 2-3 minutes. This will help trap moisture in the skin.   -Aggressive use of a moisturizer at least 1-2 times a day to the entire body (including -Vanicream, Cetaphil, Aquaphor or Cerave) and moisturize hands after every washing.  -We recommend using moisturizers that come in a tub that needs to be scooped out, not a pump. This has more of an oil base. It will hold moisture in your skin much better than a water base moisturizer. The above recommended are non-pore clogging.           Wear a sunscreen with at least SPF 30 on your face, ears, neck and V of the chest daily. Wear sunscreen on other areas of the body if those areas are exposed to the sun throughout the day. Sunscreens can contain physical and/or chemical blockers. Physical blockers are less likely to clog pores, these include zinc oxide and titanium dioxide. Reapply every two hour and after swimming. Sunscreen examples include Neutrogena, CeraVe, Jacob Lizard, Elta MD and many others.    UV radiation  UVA radiation remains constant throughout the day and throughout the year. It is a longer wavelength than UVB and therefore penetrates deeper into the skin leading to immediate and delayed tanning, photoaging, and skin cancer. 70-80% of UVA and UVB radiation occurs between the hours of 10am-2pm.  UVB radiation  UVB radiation causes the most harmful effects and is more significant during the summer months. However, snow and ice can reflect UVB radiation leading to skin damage during the winter months as well. UVB radiation is responsible for tanning, burning, inflammation, delayed erythema (pinkness), pigmentation (brown spots), and skin cancer.     Treat allergies   Drink plenty of water  Get plenty of rest  Consider Teamine eye cream  Consider surgery

## 2019-04-04 NOTE — PROGRESS NOTES
HPI:  I was asked to see pt by Dr. Kim. Jarad Javier is a 63 year old male patient here today for growth under right eye .  Patient states this has been present for 1 year.  Patient reports the following symptoms: crusty .  Patient reports the following previous treatments: none.  Patient reports the following modifying factors: none.  Associated symptoms: none.  Patient has no other skin complaints today.  Remainder of the HPI, Meds, PMH, Allergies, FH, and SH was reviewed in chart.    Pertinent Hx:   No personal or family history of skin cancer    Past Medical History:   Diagnosis Date     Anxiety 2013    I take Citalopram     Arthritis 2002    Some in hands but mostly feet, big toe joints     Gastroesophageal reflux disease 2012?    Take antacids when needed     Hypertension 1970's    Borderline high to high, white coat syndrome       Past Surgical History:   Procedure Laterality Date     ADENOIDECTOMY  1960's    When I was about 8     COLONOSCOPY       TONSILLECTOMY  1960's    When I was about 8        Family History   Problem Relation Age of Onset     Hypertension Mother      Asthma Mother      Glaucoma No family hx of      Macular Degeneration No family hx of      Diabetes No family hx of        Social History     Socioeconomic History     Marital status:      Spouse name: Not on file     Number of children: Not on file     Years of education: Not on file     Highest education level: Not on file   Occupational History     Not on file   Social Needs     Financial resource strain: Not on file     Food insecurity:     Worry: Not on file     Inability: Not on file     Transportation needs:     Medical: Not on file     Non-medical: Not on file   Tobacco Use     Smoking status: Never Smoker     Smokeless tobacco: Never Used   Substance and Sexual Activity     Alcohol use: Yes     Comment: Very occasional     Drug use: No     Sexual activity: Not Currently     Partners: Male     Birth  control/protection: None   Lifestyle     Physical activity:     Days per week: Not on file     Minutes per session: Not on file     Stress: Not on file   Relationships     Social connections:     Talks on phone: Not on file     Gets together: Not on file     Attends Voodoo service: Not on file     Active member of club or organization: Not on file     Attends meetings of clubs or organizations: Not on file     Relationship status: Not on file     Intimate partner violence:     Fear of current or ex partner: Not on file     Emotionally abused: Not on file     Physically abused: Not on file     Forced sexual activity: Not on file   Other Topics Concern     Parent/sibling w/ CABG, MI or angioplasty before 65F 55M? Not Asked   Social History Narrative     Not on file       Outpatient Encounter Medications as of 2019   Medication Sig Dispense Refill     cholecalciferol (VITAMIN D3) 5000 UNITS CAPS capsule Take 5,000 Units by mouth       citalopram (CELEXA) 20 MG tablet Take 1 tablet (20 mg) by mouth daily 90 tablet 3     coenzyme Q-10 10 MG CAPS        fluticasone (FLONASE) 50 MCG/ACT spray Spray 1 spray into both nostrils daily       lisinopril-hydrochlorothiazide (PRINZIDE/ZESTORETIC) 20-12.5 MG tablet TAKE 2 TABLETS BY MOUTH DAILY 180 tablet 11     simvastatin (ZOCOR) 20 MG tablet Take 1 tablet (20 mg) by mouth At Bedtime 90 tablet 3     [] ibuprofen (ADVIL/MOTRIN) 600 MG tablet Take 1 tablet (600 mg) by mouth every 8 hours as needed for pain 30 tablet 0     No facility-administered encounter medications on file as of 2019.        Review Of Systems:  Skin: As above  Eyes: negative  Ears/Nose/Throat: negative  Respiratory: No shortness of breath, dyspnea on exertion, cough, or hemoptysis  Cardiovascular: negative  Gastrointestinal: negative  Genitourinary: negative  Musculoskeletal: negative  Neurologic: negative  Psychiatric: negative  Hematologic/Lymphatic/Immunologic: negative  Endocrine:  negative      Objective:     /78   Eyes: Conjunctivae/lids: Normal   ENT: Lips:  Normal  MSK: Normal  Cardiovascular: Peripheral edema none  Pulm: Breathing Normal  Neuro/Psych: Orientation: Normal; Mood/Affect: Normal, NAD, WDWN  Pt accompanied by: self  Following areas examined: face, neck, hands, forearms  Gore skin type:ii   Findings:  Garduno WD smooth macules on forearms  Well circumscribed macules with symmetric color distribution on forearms 2mm  Hypopigmented smooth macules on ventral forearms   Flesh colored papule/s with yellow lobules and central depression on forehead  Red smooth well-defined macules on face  hemecrusted macule with surrounding blanchable red macules on right inferior eyelid 0.2cm  Brown, stuck-on scaly appearing papules left cheek  Hyperpigmentation of undereye area      Assessment and Plan:  1) Lentigines, multiple benign nevi, cherry angiomas, IGH, SGH,  and seborrheic keratoses    Treatment of these lesions would be purely cosmetic and not medically neccessary.  Lesion may recur and/or may not completely resolve. May need additional treatment.  Different removal options including excision, cryotherapy, cautery and /or laser.      2) Neoplasm of uncertain behavior on right inferior eyelid 0.2cm  SCC vs BCC vs ISK  TANGENTIAL BIOPSY:  After consent, anesthesia with LEC and prep, tangential biopsy performed.  No complications and routine wound care.  May grow back and will get a scar. Based on lesion type may need to completely remove lesion. Patient will be notified in 7-10 days of results. Wound care directions given.    3) undereye bags/hyperpigmentation  Treat allergies   Drink plenty of water  Get plenty of rest  Consider Teamine eye cream  Consider surgery    Signs and Symptoms of non-melanoma skin cancer and ABCDEs of melanoma rUV precautions reviewed with patient. Patient was asked about new or changing moles/lesions on body.   Wear a sunscreen with at least SPF 30 on  your face, ears, neck and V of the chest daily. Wear sunscreen on other areas of the body if those areas are exposed to the sun throughout the day. Sunscreens can contain physical and/or chemical blockers. Physical blockers are less likely to clog pores, these include zinc oxide and titanium dioxide. Reapply every two hour and after swimming. Sunscreen examples include Neutrogena, CeraVe, Blue Lizard, Elta MD and many others.            Follow up in yearly FBE

## 2019-04-12 ENCOUNTER — TELEPHONE (OUTPATIENT)
Dept: DERMATOLOGY | Facility: CLINIC | Age: 64
End: 2019-04-12

## 2019-04-12 LAB — COPATH REPORT: NORMAL

## 2019-04-12 NOTE — TELEPHONE ENCOUNTER
Notes recorded by aYmel Whitlock PA-C on 4/12/2019 at 10:40 AM CDT  Shave, right inferior eyelid:   - Consistent with traumatized and inflamed benign keratosis This is a benign lesion that does not need any additional treatment.

## 2019-04-12 NOTE — TELEPHONE ENCOUNTER
Called and spoke to patient. Educated patient on biopsy results- traumatized and inflamed benign keratosis (benign). No further treatment needed, patient voiced understanding.    Familia RN-BSN  [facility]  [phone]

## 2019-04-19 DIAGNOSIS — F41.9 ANXIETY: ICD-10-CM

## 2019-04-19 DIAGNOSIS — I10 ESSENTIAL HYPERTENSION: ICD-10-CM

## 2019-04-19 DIAGNOSIS — Z91.89 AT RISK FOR HEART DISEASE: ICD-10-CM

## 2019-04-19 NOTE — TELEPHONE ENCOUNTER
Simvastatin  Last Written Prescription Date:  1/7/2019  Last Fill Quantity: 90,  # refills: 3   Last office visit: 4/4/2019 with prescribing provider:  Frandson   Future Office Visit:      Celexa  Last Written Prescription Date:  1/7/2019  Last Fill Quantity: 90,  # refills: 3   Last office visit: 4/4/2019 with prescribing provider:  Frandson   Future Office Visit:      Lisinopril  Last Written Prescription Date:  1/7/2019  Last Fill Quantity: 90,  # refills: 3   Last office visit: 4/4/2019 with prescribing provider:  Frandson   Future Office Visit:       Requested Prescriptions   Pending Prescriptions Disp Refills     citalopram (CELEXA) 20 MG tablet 90 tablet 3     Sig: Take 1 tablet (20 mg) by mouth daily       There is no refill protocol information for this order        lisinopril-hydrochlorothiazide (PRINZIDE/ZESTORETIC) 20-12.5 MG tablet 180 tablet 11     Sig: TAKE 2 TABLETS BY MOUTH DAILY       There is no refill protocol information for this order        simvastatin (ZOCOR) 20 MG tablet 90 tablet 3     Sig: Take 1 tablet (20 mg) by mouth At Bedtime       There is no refill protocol information for this order

## 2019-04-22 RX ORDER — CITALOPRAM HYDROBROMIDE 20 MG/1
20 TABLET ORAL DAILY
Qty: 90 TABLET | Refills: 2 | Status: SHIPPED | OUTPATIENT
Start: 2019-04-22 | End: 2020-01-30

## 2019-04-22 RX ORDER — LISINOPRIL AND HYDROCHLOROTHIAZIDE 12.5; 2 MG/1; MG/1
TABLET ORAL
Qty: 180 TABLET | Refills: 2 | Status: SHIPPED | OUTPATIENT
Start: 2019-04-22 | End: 2020-01-30

## 2019-04-22 RX ORDER — SIMVASTATIN 20 MG
20 TABLET ORAL AT BEDTIME
Qty: 90 TABLET | Refills: 2 | Status: SHIPPED | OUTPATIENT
Start: 2019-04-22 | End: 2020-01-30

## 2019-04-22 NOTE — TELEPHONE ENCOUNTER
Pharmacy change to mail order  Prescription approved per [facility] Refill Protocol.  Alison BUTLER RN

## 2019-08-12 DIAGNOSIS — Z91.89 AT RISK FOR HEART DISEASE: ICD-10-CM

## 2019-08-12 NOTE — TELEPHONE ENCOUNTER
SIMVASTATIN   Last Written Prescription Date:  04/22/2019  Last Fill Quantity: 90,  # refills: 2   Last office visit: 4/4/2019 with prescribing provider:  GIANNI   Future Office Visit:  Nothing at this time scheduled.    Requested Prescriptions   Pending Prescriptions Disp Refills     simvastatin (ZOCOR) 20 MG tablet 90 tablet 2     Sig: Take 1 tablet (20 mg) by mouth At Bedtime       There is no refill protocol information for this order

## 2019-08-13 RX ORDER — SIMVASTATIN 20 MG
20 TABLET ORAL AT BEDTIME
Start: 2019-08-13

## 2020-01-29 NOTE — PROGRESS NOTES
"SUBJECTIVE:   Jarad Javier is a 64 year old male who presents for Preventive Visit.  Are you in the first 12 months of your Medicare coverage?  No    Healthy Habits:     In general, how would you rate your overall health?  Good    Frequency of exercise:  None    Duration of exercise:  Less than 15 minutes    Do you usually eat at least 4 servings of fruit and vegetables a day, include whole grains    & fiber and avoid regularly eating high fat or \"junk\" foods?  No    Taking medications regularly:  Yes    Barriers to taking medications:  None    Medication side effects:  None    Ability to successfully perform activities of daily living:  No assistance needed    Home Safety:  No safety concerns identified    Hearing Impairment:  No hearing concerns    In the past 6 months, have you been bothered by leaking of urine?  No    In general, how would you rate your overall mental or emotional health?  Very good      PHQ-2 Total Score: 0    Additional concerns today:  No    Do you feel safe in your environment? Yes    Have you ever done Advance Care Planning? (For example, a Health Directive, POLST, or a discussion with a medical provider or your loved ones about your wishes): Yes, patient states has an Advance Care Planning document and will bring a copy to the clinic.      Fall risk       Cognitive Screening   1) Repeat 3 items (Leader, Season, Table)    2) Clock draw: NORMAL  3) 3 item recall: Recalls 3 objects  Results: 3 items recalled: COGNITIVE IMPAIRMENT LESS LIKELY    Mini-CogTM Copyright S Prabhjot. Licensed by the author for use in [facility]; reprinted with permission (parish@.[facility]). All rights reserved.      Do you have sleep apnea, excessive snoring or daytime drowsiness?: no    Reviewed and updated as needed this visit by clinical staff         Reviewed and updated as needed this visit by Provider        Social History     Tobacco Use     Smoking status: Never Smoker     Smokeless tobacco: " Never Used   Substance Use Topics     Alcohol use: Yes     Comment: Very occasional         Alcohol Use 1/7/2019   Prescreen: >3 drinks/day or >7 drinks/week? No   Prescreen: >3 drinks/day or >7 drinks/week? -         Current providers sharing in care for this patient include:   Patient Care Team:  Edilson Kim MD as PCP - General (Family Practice)  Edilson Kim MD as Assigned PCP    The following health maintenance items are reviewed in Epic and correct as of today:  Health Maintenance   Topic Date Due     HEPATITIS C SCREENING  1955     ADVANCE CARE PLANNING  1955     ZOSTER IMMUNIZATION (2 of 3) 12/01/2017     FALL RISK ASSESSMENT  10/19/2018     PHQ-2  01/01/2020     MEDICARE ANNUAL WELLNESS VISIT  01/07/2020     PNEUMOCOCCAL IMMUNIZATION 65+ LOW/MEDIUM RISK (2 of 2 - PPSV23) 01/07/2020     DTAP/TDAP/TD IMMUNIZATION (2 - Td) 05/16/2023     LIPID  01/07/2024     COLONOSCOPY  10/09/2028     INFLUENZA VACCINE  Completed     AORTIC ANEURYSM SCREENING (SYSTEM ASSIGNED)  Completed     IPV IMMUNIZATION  Aged Out     MENINGITIS IMMUNIZATION  Aged Out     Lab work is in process  Labs reviewed in EPIC  BP Readings from Last 3 Encounters:   01/30/20 (!) 140/88   04/04/19 130/78   02/08/19 118/67    Wt Readings from Last 3 Encounters:   01/30/20 121.6 kg (268 lb 1.6 oz)   02/08/19 117.9 kg (260 lb)   01/07/19 117.9 kg (260 lb)                  Patient Active Problem List   Diagnosis     Allergic rhinitis     Anxiety     Chronic rhinitis     Essential hypertension     Screen for colon cancer     Dysfunction of eustachian tube     Dysphonia     Obesity (BMI 35.0-39.9) with comorbidity (H)     Past Surgical History:   Procedure Laterality Date     ADENOIDECTOMY  1960's    When I was about 8     COLONOSCOPY       TONSILLECTOMY  1960's    When I was about 8       Social History     Tobacco Use     Smoking status: Never Smoker     Smokeless tobacco: Never Used   Substance Use Topics      Alcohol use: Yes     Comment: Very occasional     Family History   Problem Relation Age of Onset     Hypertension Mother      Asthma Mother      Glaucoma No family hx of      Macular Degeneration No family hx of      Diabetes No family hx of          Current Outpatient Medications   Medication Sig Dispense Refill     cholecalciferol (VITAMIN D3) 5000 UNITS CAPS capsule Take 5,000 Units by mouth       citalopram (CELEXA) 20 MG tablet Take 1 tablet (20 mg) by mouth daily 90 tablet 2     coenzyme Q-10 10 MG CAPS        fluticasone (FLONASE) 50 MCG/ACT spray Spray 1 spray into both nostrils daily       FLUZONE HIGH-DOSE 0.5 ML injection TO BE ADMINISTERED BY PHARMACIST FOR IMMUNIZATION       lisinopril-hydrochlorothiazide (PRINZIDE/ZESTORETIC) 20-12.5 MG tablet TAKE 2 TABLETS BY MOUTH DAILY 180 tablet 2     omeprazole (PRILOSEC) 20 MG DR capsule Take 20 mg by mouth every 24 hours       simvastatin (ZOCOR) 20 MG tablet Take 1 tablet (20 mg) by mouth At Bedtime 90 tablet 2     No Known Allergies  Immunization History   Administered Date(s) Administered     Influenza Vaccine IM > 6 months Valent IIV4 10/06/2017     Pneumo Conj 13-V (2010&after) 01/07/2019     TDAP Vaccine (Boostrix) 05/16/2013     Zoster vaccine, live 10/06/2017       Review of Systems    Constitutional: no recent illness, no fevers/sweats/chills  Eyes: No vision changes or eye irritation  Ears/Nose/Throat: No runny nose, sore throat or ear pain  CV: no palpitations, no chest pain, no lower extremity swelling.  Resp: no shortness of breath, wheezing, or cough.  GI: no nausea/vomiting/diarrhea, no black or bloody stools  : no burning or urgency with urination  Skin: no rash  Musculoskeletal: no joint pain, muscle pain, weakness  Psych: no depression, no concerns about anxiety  Neuro: no new headaches, dizziness, numbness, or tingling      OBJECTIVE:   There were no vitals taken for this visit. Estimated body mass index is 37.31 kg/m  as calculated from  "the following:    Height as of 2/8/19: 1.778 m (5' 10\").    Weight as of 2/8/19: 117.9 kg (260 lb).  Physical Exam    General Appearance: Pleasant, alert, in no acute respiratory distress.  Head Exam: Normal. Normocephalic, atraumatic. No sinus tenderness.  Eye Exam: Normal external eye, conjunctiva, lids. JAVED.  Ear Exam: Normal auditory canals and external ears. Non-tender.  Left TM-normal. Right TM-normal.  OroPharynx Exam: Dental hygiene adequate. Normal buccal mucosa. Normal pharynx.  Neck Exam: Supple, no masses or enlarged, tender nodes.  Thyroid Exam: No nodules or enlargement or tenderness.  Chest/Respiratory Exam: Normal, comfortable, easy respirations. Chest wall normal. Lungs are clear to auscultation. No wheezing, crackles, or rhonchi.  Cardiovascular Exam: Regular rate and rhythm. No murmur, gallop, or rubs. No pedal edema.  Gastrointestinal Exam: Soft, non-tender, no masses or organomegaly.  Musculoskeletal Exam: Back is non-tender, full ROM of upper and lower extremities.  Skin: no rash, warm and dry.    Neurologic Exam: Nonfocal, no tremor. Normal gait.  Psychiatric Exam: Alert - appropriate, normal affect      ASSESSMENT / PLAN:       ICD-10-CM    1. Encounter for Medicare annual wellness exam Z00.00    2. Anxiety F41.9 citalopram (CELEXA) 20 MG tablet   3. At risk for heart disease Z91.89 simvastatin (ZOCOR) 20 MG tablet   4. Essential hypertension I10 lisinopril-hydrochlorothiazide (PRINZIDE/ZESTORETIC) 20-12.5 MG tablet     Renal panel (Alb, BUN, Ca, Cl, CO2, Creat, Gluc, Phos, K, Na)     Lipid panel reflex to direct LDL Non-fasting   5. Need for hepatitis C screening test Z11.59 **Hepatitis C Screen Reflex to RNA FUTURE anytime   6. Need for vaccination Z23 Pneumococcal vaccine 23 valent PPSV23  (Pneumovax) [28653]     ADMIN MEDICARE: Pneumococcal Vaccine ()   7. Screening for prostate cancer Z12.5 PSA, screen   8. Morbid obesity (H) E66.01      Patient has stable chronic conditions as " "noted in A/P including Diagnoses of Anxiety, At risk for heart disease, Essential hypertension,   and Morbid obesity (H) were also pertinent to this visit.    These diagnoses were each reviewed for stability and medications were reviewed and refilled, labs ordered and updated.      COUNSELING:  Reviewed preventive health counseling, as reflected in patient instructions       Regular exercise       Healthy diet/nutrition       Vision screening       Hearing screening       Dental care       Bladder control       Colon cancer screening       Prostate cancer screening    Estimated body mass index is 37.31 kg/m  as calculated from the following:    Height as of 2/8/19: 1.778 m (5' 10\").    Weight as of 2/8/19: 117.9 kg (260 lb).    Weight management plan: Discussed healthy diet and exercise guidelines     reports that he has never smoked. He has never used smokeless tobacco.      Appropriate preventive services were discussed with this patient, including applicable screening as appropriate for cardiovascular disease, diabetes, osteopenia/osteoporosis, and glaucoma.  As appropriate for age/gender, discussed screening for colorectal cancer, prostate cancer, breast cancer, and cervical cancer. Checklist reviewing preventive services available has been given to the patient.    Reviewed patients plan of care and provided an AVS. The Intermediate Care Plan ( asthma action plan, low back pain action plan, and migraine action plan) for Jarad meets the Care Plan requirement. This Care Plan has been established and reviewed with the Patient.    Counseling Resources:  ATP IV Guidelines  Pooled Cohorts Equation Calculator  Breast Cancer Risk Calculator  FRAX Risk Assessment  ICSI Preventive Guidelines  Dietary Guidelines for Americans, 2010  USDA's MyPlate  ASA Prophylaxis  Lung CA Screening    Edilson Kim MD  [facility]    Identified Health Risks:  "

## 2020-01-29 NOTE — PATIENT INSTRUCTIONS
Patient Education   Personalized Prevention Plan  You are due for the preventive services outlined below.  Your care team is available to assist you in scheduling these services.  If you have already completed any of these items, please share that information with your care team to update in your medical record.  Health Maintenance Due   Topic Date Due     Hepatitis C Screening  1955     Discuss Advance Care Planning  1955     Zoster (Shingles) Vaccine (2 of 3) 12/01/2017     FALL RISK ASSESSMENT  10/19/2018     PHQ-2  01/01/2020     Annual Wellness Visit  01/07/2020     Pneumococcal Vaccine (2 of 2 - PPSV23) 01/07/2020

## 2020-01-30 ENCOUNTER — OFFICE VISIT (OUTPATIENT)
Dept: FAMILY MEDICINE | Facility: CLINIC | Age: 65
End: 2020-01-30
Payer: MEDICARE

## 2020-01-30 VITALS
OXYGEN SATURATION: 92 % | BODY MASS INDEX: 38.38 KG/M2 | SYSTOLIC BLOOD PRESSURE: 140 MMHG | TEMPERATURE: 97.6 F | HEIGHT: 70 IN | WEIGHT: 268.1 LBS | DIASTOLIC BLOOD PRESSURE: 88 MMHG | HEART RATE: 74 BPM

## 2020-01-30 DIAGNOSIS — Z00.00 ENCOUNTER FOR MEDICARE ANNUAL WELLNESS EXAM: Primary | ICD-10-CM

## 2020-01-30 DIAGNOSIS — Z12.5 SCREENING FOR PROSTATE CANCER: ICD-10-CM

## 2020-01-30 DIAGNOSIS — Z11.59 NEED FOR HEPATITIS C SCREENING TEST: ICD-10-CM

## 2020-01-30 DIAGNOSIS — F41.9 ANXIETY: ICD-10-CM

## 2020-01-30 DIAGNOSIS — Z91.89 AT RISK FOR HEART DISEASE: ICD-10-CM

## 2020-01-30 DIAGNOSIS — E66.01 MORBID OBESITY (H): ICD-10-CM

## 2020-01-30 DIAGNOSIS — I10 ESSENTIAL HYPERTENSION: ICD-10-CM

## 2020-01-30 DIAGNOSIS — Z23 NEED FOR VACCINATION: ICD-10-CM

## 2020-01-30 PROCEDURE — 80061 LIPID PANEL: CPT | Performed by: FAMILY MEDICINE

## 2020-01-30 PROCEDURE — G0438 PPPS, INITIAL VISIT: HCPCS | Performed by: FAMILY MEDICINE

## 2020-01-30 PROCEDURE — 36415 COLL VENOUS BLD VENIPUNCTURE: CPT | Performed by: FAMILY MEDICINE

## 2020-01-30 PROCEDURE — 80069 RENAL FUNCTION PANEL: CPT | Performed by: FAMILY MEDICINE

## 2020-01-30 PROCEDURE — 90732 PPSV23 VACC 2 YRS+ SUBQ/IM: CPT | Performed by: FAMILY MEDICINE

## 2020-01-30 PROCEDURE — G0009 ADMIN PNEUMOCOCCAL VACCINE: HCPCS | Performed by: FAMILY MEDICINE

## 2020-01-30 PROCEDURE — G0103 PSA SCREENING: HCPCS | Performed by: FAMILY MEDICINE

## 2020-01-30 PROCEDURE — G0472 HEP C SCREEN HIGH RISK/OTHER: HCPCS | Performed by: FAMILY MEDICINE

## 2020-01-30 RX ORDER — INFLUENZA A VIRUS A/VICTORIA/4897/2022 IVR-238 (H1N1) ANTIGEN (FORMALDEHYDE INACTIVATED), INFLUENZA A VIRUS A/CALIFORNIA/122/2022 SAN-022 (H3N2) ANTIGEN (FORMALDEHYDE INACTIVATED), AND INFLUENZA B VIRUS B/MICHIGAN/01/2021 ANTIGEN (FORMALDEHYDE INACTIVATED) 60; 60; 60 UG/.5ML; UG/.5ML; UG/.5ML
INJECTION, SUSPENSION INTRAMUSCULAR
COMMUNITY
Start: 2019-10-01

## 2020-01-30 RX ORDER — SIMVASTATIN 20 MG
20 TABLET ORAL AT BEDTIME
Qty: 90 TABLET | Refills: 4 | Status: SHIPPED | OUTPATIENT
Start: 2020-01-30 | End: 2021-01-14

## 2020-01-30 RX ORDER — CITALOPRAM HYDROBROMIDE 20 MG/1
20 TABLET ORAL DAILY
Qty: 90 TABLET | Refills: 4 | Status: SHIPPED | OUTPATIENT
Start: 2020-01-30 | End: 2021-01-14

## 2020-01-30 RX ORDER — LISINOPRIL AND HYDROCHLOROTHIAZIDE 12.5; 2 MG/1; MG/1
TABLET ORAL
Qty: 180 TABLET | Refills: 4 | Status: SHIPPED | OUTPATIENT
Start: 2020-01-30 | End: 2021-01-14

## 2020-01-30 ASSESSMENT — PATIENT HEALTH QUESTIONNAIRE - PHQ9
SUM OF ALL RESPONSES TO PHQ QUESTIONS 1-9: 0
SUM OF ALL RESPONSES TO PHQ QUESTIONS 1-9: 0

## 2020-01-30 ASSESSMENT — ANXIETY QUESTIONNAIRES
GAD7 TOTAL SCORE: 0
1. FEELING NERVOUS, ANXIOUS, OR ON EDGE: NOT AT ALL
7. FEELING AFRAID AS IF SOMETHING AWFUL MIGHT HAPPEN: NOT AT ALL
3. WORRYING TOO MUCH ABOUT DIFFERENT THINGS: NOT AT ALL
GAD7 TOTAL SCORE: 0
4. TROUBLE RELAXING: NOT AT ALL
7. FEELING AFRAID AS IF SOMETHING AWFUL MIGHT HAPPEN: NOT AT ALL
5. BEING SO RESTLESS THAT IT IS HARD TO SIT STILL: NOT AT ALL
GAD7 TOTAL SCORE: 0
6. BECOMING EASILY ANNOYED OR IRRITABLE: NOT AT ALL
2. NOT BEING ABLE TO STOP OR CONTROL WORRYING: NOT AT ALL

## 2020-01-30 ASSESSMENT — MIFFLIN-ST. JEOR: SCORE: 2002.34

## 2020-01-30 ASSESSMENT — ACTIVITIES OF DAILY LIVING (ADL): CURRENT_FUNCTION: NO ASSISTANCE NEEDED

## 2020-01-30 NOTE — LETTER
February 7, 2020    Jarad Javier  [address]  [facility] [address]    Dear Jarad,    It was nice to see you at your recent visit.  Enclosed are the lab results taken at that time.    Results for orders placed or performed in visit on 01/30/20   Renal panel (Alb, BUN, Ca, Cl, CO2, Creat, Gluc, Phos, K, Na)     Status: None   Result Value Ref Range    Sodium 138 133 - 144 mmol/L    Potassium 3.8 3.4 - 5.3 mmol/L    Chloride 105 94 - 109 mmol/L    Carbon Dioxide 25 20 - 32 mmol/L    Anion Gap 8 3 - 14 mmol/L    Glucose 90 70 - 99 mg/dL    Urea Nitrogen 20 7 - 30 mg/dL    Creatinine 0.87 0.66 - 1.25 mg/dL    GFR Estimate 90 >60 mL/min/[1.73_m2]    GFR Estimate If Black >90 >60 mL/min/[1.73_m2]    Calcium 9.4 8.5 - 10.1 mg/dL    Phosphorus 3.7 2.5 - 4.5 mg/dL    Albumin 3.9 3.4 - 5.0 g/dL   Lipid panel reflex to direct LDL Non-fasting     Status: Abnormal   Result Value Ref Range    Cholesterol 141 <200 mg/dL    Triglycerides 224 (H) <150 mg/dL    HDL Cholesterol 37 (L) >39 mg/dL    LDL Cholesterol Calculated 59 <100 mg/dL    Non HDL Cholesterol 104 <130 mg/dL   **Hepatitis C Screen Reflex to RNA FUTURE anytime     Status: None   Result Value Ref Range    Hepatitis C Antibody Nonreactive NR^Nonreactive   PSA, screen     Status: None   Result Value Ref Range    PSA 0.26 0 - 4 ug/L     Your test results are basically normal.  Follow up as needed or in 1 year.    Sincerely,  Edilson Kim MD MPH

## 2020-01-31 LAB
ALBUMIN SERPL-MCNC: 3.9 G/DL (ref 3.4–5)
ANION GAP SERPL CALCULATED.3IONS-SCNC: 8 MMOL/L (ref 3–14)
BUN SERPL-MCNC: 20 MG/DL (ref 7–30)
CALCIUM SERPL-MCNC: 9.4 MG/DL (ref 8.5–10.1)
CHLORIDE SERPL-SCNC: 105 MMOL/L (ref 94–109)
CHOLEST SERPL-MCNC: 141 MG/DL
CO2 SERPL-SCNC: 25 MMOL/L (ref 20–32)
CREAT SERPL-MCNC: 0.87 MG/DL (ref 0.66–1.25)
GFR SERPL CREATININE-BSD FRML MDRD: 90 ML/MIN/{1.73_M2}
GLUCOSE SERPL-MCNC: 90 MG/DL (ref 70–99)
HCV AB SERPL QL IA: NONREACTIVE
HDLC SERPL-MCNC: 37 MG/DL
LDLC SERPL CALC-MCNC: 59 MG/DL
NONHDLC SERPL-MCNC: 104 MG/DL
PHOSPHATE SERPL-MCNC: 3.7 MG/DL (ref 2.5–4.5)
POTASSIUM SERPL-SCNC: 3.8 MMOL/L (ref 3.4–5.3)
PSA SERPL-ACNC: 0.26 UG/L (ref 0–4)
SODIUM SERPL-SCNC: 138 MMOL/L (ref 133–144)
TRIGL SERPL-MCNC: 224 MG/DL

## 2020-01-31 ASSESSMENT — ANXIETY QUESTIONNAIRES: GAD7 TOTAL SCORE: 0

## 2021-01-12 ASSESSMENT — ACTIVITIES OF DAILY LIVING (ADL): CURRENT_FUNCTION: NO ASSISTANCE NEEDED

## 2021-01-14 ENCOUNTER — OFFICE VISIT (OUTPATIENT)
Dept: FAMILY MEDICINE | Facility: CLINIC | Age: 66
End: 2021-01-14
Payer: MEDICARE

## 2021-01-14 VITALS
TEMPERATURE: 96.8 F | BODY MASS INDEX: 36.11 KG/M2 | SYSTOLIC BLOOD PRESSURE: 124 MMHG | HEART RATE: 67 BPM | RESPIRATION RATE: 20 BRPM | OXYGEN SATURATION: 98 % | WEIGHT: 243.8 LBS | DIASTOLIC BLOOD PRESSURE: 78 MMHG | HEIGHT: 69 IN

## 2021-01-14 DIAGNOSIS — I10 ESSENTIAL HYPERTENSION: ICD-10-CM

## 2021-01-14 DIAGNOSIS — E78.2 MIXED HYPERLIPIDEMIA: ICD-10-CM

## 2021-01-14 DIAGNOSIS — Z91.89 AT RISK FOR HEART DISEASE: ICD-10-CM

## 2021-01-14 DIAGNOSIS — Z00.00 ENCOUNTER FOR MEDICARE ANNUAL WELLNESS EXAM: Primary | ICD-10-CM

## 2021-01-14 DIAGNOSIS — F41.9 ANXIETY: ICD-10-CM

## 2021-01-14 PROCEDURE — 80048 BASIC METABOLIC PNL TOTAL CA: CPT | Performed by: FAMILY MEDICINE

## 2021-01-14 PROCEDURE — 36415 COLL VENOUS BLD VENIPUNCTURE: CPT | Performed by: FAMILY MEDICINE

## 2021-01-14 PROCEDURE — 80061 LIPID PANEL: CPT | Performed by: FAMILY MEDICINE

## 2021-01-14 PROCEDURE — G0439 PPPS, SUBSEQ VISIT: HCPCS | Performed by: FAMILY MEDICINE

## 2021-01-14 RX ORDER — CITALOPRAM HYDROBROMIDE 20 MG/1
20 TABLET ORAL DAILY
Qty: 90 TABLET | Refills: 4 | Status: SHIPPED | OUTPATIENT
Start: 2021-01-14

## 2021-01-14 RX ORDER — SIMVASTATIN 20 MG
20 TABLET ORAL AT BEDTIME
Qty: 90 TABLET | Refills: 4 | Status: SHIPPED | OUTPATIENT
Start: 2021-01-14

## 2021-01-14 RX ORDER — LISINOPRIL AND HYDROCHLOROTHIAZIDE 12.5; 2 MG/1; MG/1
TABLET ORAL
Qty: 180 TABLET | Refills: 4 | Status: SHIPPED | OUTPATIENT
Start: 2021-01-14 | End: 2022-02-04

## 2021-01-14 ASSESSMENT — ACTIVITIES OF DAILY LIVING (ADL): CURRENT_FUNCTION: NO ASSISTANCE NEEDED

## 2021-01-14 ASSESSMENT — MIFFLIN-ST. JEOR: SCORE: 1876.01

## 2021-01-14 NOTE — PATIENT INSTRUCTIONS
Patient Education   Personalized Prevention Plan  You are due for the preventive services outlined below.  Your care team is available to assist you in scheduling these services.  If you have already completed any of these items, please share that information with your care team to update in your medical record.  Health Maintenance Due   Topic Date Due     Zoster (Shingles) Vaccine (2 of 3) 12/01/2017     Annual Wellness Visit  01/30/2021     FALL RISK ASSESSMENT  01/30/2021

## 2021-01-14 NOTE — PROGRESS NOTES
"SUBJECTIVE:   Jarad Javier is a 65 year old male who presents for Preventive Visit.    Patient has been advised of split billing requirements and indicates understanding: Yes   Are you in the first 12 months of your Medicare coverage?  No    Healthy Habits:     In general, how would you rate your overall health?  Good    Frequency of exercise:  4-5 days/week    Duration of exercise:  15-30 minutes    Do you usually eat at least 4 servings of fruit and vegetables a day, include whole grains    & fiber and avoid regularly eating high fat or \"junk\" foods?  No    Taking medications regularly:  Yes    Medication side effects:  None    Ability to successfully perform activities of daily living:  No assistance needed    Home Safety:  No safety concerns identified    Hearing Impairment:  Difficulty following a conversation in a noisy restaurant or crowded room and difficulty understanding soft or whispered speech    In the past 6 months, have you been bothered by leaking of urine?  No    In general, how would you rate your overall mental or emotional health?  Excellent      PHQ-2 Total Score: 0    Additional concerns today:  Yes    Pt still having worsening bilateral thumb pain.  We discussed thumb arthritis     Pt also wondering about COVID vaccine.    Do you feel safe in your environment? Yes    Have you ever done Advance Care Planning? (For example, a Health Directive, POLST, or a discussion with a medical provider or your loved ones about your wishes): Yes, patient states has an Advance Care Planning document and will bring a copy to the clinic. Pt brought in copy today.      Fall risk  Fallen 2 or more times in the past year?: No  Any fall with injury in the past year?: No    Cognitive Screening   1) Repeat 3 items (Leader, Season, Table)    2) Clock draw: NORMAL  3) 3 item recall: Recalls 3 objects  Results: 3 items recalled: COGNITIVE IMPAIRMENT LESS LIKELY    Mini-CogTM Copyright LONI Rick. Licensed by the " author for use in [facility]; reprinted with permission (parish@[facility]). All rights reserved.      Do you have sleep apnea, excessive snoring or daytime drowsiness?: no    Reviewed and updated as needed this visit by clinical staff  Tobacco  Allergies  Meds   Med Hx  Surg Hx  Fam Hx  Soc Hx        Reviewed and updated as needed this visit by Provider                Social History     Tobacco Use     Smoking status: Never Smoker     Smokeless tobacco: Never Used   Substance Use Topics     Alcohol use: Yes     Comment: Very occasional     If you drink alcohol do you typically have >3 drinks per day or >7 drinks per week? No    Alcohol Use 1/12/2021   Prescreen: >3 drinks/day or >7 drinks/week? Not Applicable   Prescreen: >3 drinks/day or >7 drinks/week? -   No flowsheet data found.          Current providers sharing in care for this patient include:   Patient Care Team:  Edilson Kim MD as PCP - General (Family Practice)  Edilson Kim MD as Assigned PCP    The following health maintenance items are reviewed in Epic and correct as of today:  Health Maintenance   Topic Date Due     ZOSTER IMMUNIZATION (2 of 3) 12/01/2017     FALL RISK ASSESSMENT  01/30/2021     MEDICARE ANNUAL WELLNESS VISIT  01/14/2022     DTAP/TDAP/TD IMMUNIZATION (2 - Td) 05/16/2023     LIPID  01/30/2025     ADVANCE CARE PLANNING  02/09/2025     COLORECTAL CANCER SCREENING  10/09/2028     HEPATITIS C SCREENING  Completed     PHQ-2  Completed     INFLUENZA VACCINE  Completed     Pneumococcal Vaccine: 65+ Years  Completed     AORTIC ANEURYSM SCREENING (SYSTEM ASSIGNED)  Completed     Pneumococcal Vaccine: Pediatrics (0 to 5 Years) and At-Risk Patients (6 to 64 Years)  Aged Out     IPV IMMUNIZATION  Aged Out     MENINGITIS IMMUNIZATION  Aged Out     HEPATITIS B IMMUNIZATION  Aged Out     Lab work is in process  Labs reviewed in EPIC  BP Readings from Last 3 Encounters:   01/14/21 124/78   01/30/20 (!) 140/88    04/04/19 130/78    Wt Readings from Last 3 Encounters:   01/14/21 110.6 kg (243 lb 12.8 oz)   01/30/20 121.6 kg (268 lb 1.6 oz)   02/08/19 117.9 kg (260 lb)                  Patient Active Problem List   Diagnosis     Allergic rhinitis     Anxiety     Chronic rhinitis     Essential hypertension     Screen for colon cancer     Dysfunction of eustachian tube     Dysphonia     Obesity (BMI 35.0-39.9) with comorbidity (H)     Past Surgical History:   Procedure Laterality Date     ADENOIDECTOMY  1960's    When I was about 8     COLONOSCOPY       TONSILLECTOMY  1960's    When I was about 8       Social History     Tobacco Use     Smoking status: Never Smoker     Smokeless tobacco: Never Used   Substance Use Topics     Alcohol use: Yes     Comment: Very occasional     Family History   Problem Relation Age of Onset     Hypertension Mother      Asthma Mother      Glaucoma No family hx of      Macular Degeneration No family hx of      Diabetes No family hx of          Current Outpatient Medications   Medication Sig Dispense Refill     cholecalciferol (VITAMIN D3) 5000 UNITS CAPS capsule Take 5,000 Units by mouth       citalopram (CELEXA) 20 MG tablet Take 1 tablet (20 mg) by mouth daily 90 tablet 4     coenzyme Q-10 10 MG CAPS        fluticasone (FLONASE) 50 MCG/ACT spray Spray 1 spray into both nostrils daily       FLUZONE HIGH-DOSE 0.5 ML injection TO BE ADMINISTERED BY PHARMACIST FOR IMMUNIZATION       lisinopril-hydrochlorothiazide (ZESTORETIC) 20-12.5 MG tablet TAKE 2 TABLETS BY MOUTH DAILY 180 tablet 4     omeprazole (PRILOSEC) 20 MG DR capsule Take 20 mg by mouth every 24 hours       simvastatin (ZOCOR) 20 MG tablet Take 1 tablet (20 mg) by mouth At Bedtime 90 tablet 4     Immunization History   Administered Date(s) Administered     Influenza (High Dose) 3 valent vaccine 10/01/2019     Influenza Vaccine IM > 6 months Valent IIV4 10/06/2017     Influenza, Quad, High Dose, Pf, 65yr + 10/09/2020     Pneumo Conj 13-V  "(2010&after) 01/07/2019     Pneumococcal 23 valent 01/30/2020     TDAP Vaccine (Boostrix) 05/16/2013     Zoster vaccine, live 10/06/2017       Review of Systems  Constitutional, HEENT, cardiovascular, pulmonary, GI, , musculoskeletal, neuro, skin, endocrine and psych systems are negative, except as otherwise noted.    OBJECTIVE:   /78 (Patient Position: Sitting, Cuff Size: Adult Regular)   Pulse 67   Temp 96.8  F (36  C) (Temporal)   Resp 20   Ht 1.76 m (5' 9.3\")   Wt 110.6 kg (243 lb 12.8 oz)   SpO2 98%   BMI 35.69 kg/m   Estimated body mass index is 35.69 kg/m  as calculated from the following:    Height as of this encounter: 1.76 m (5' 9.3\").    Weight as of this encounter: 110.6 kg (243 lb 12.8 oz).  Physical Exam    General Appearance: Pleasant, alert, in no acute respiratory distress.  Head Exam: Normal. Normocephalic, atraumatic. No sinus tenderness.  Eye Exam: Normal external eye, conjunctiva, lids. JAVED.  Ear Exam: Normal auditory canals and external ears. Non-tender.  Left TM-normal. Right TM-normal.  Neck Exam: Supple, no obvious thyroid enlargement  Chest/Respiratory Exam: Normal, comfortable, easy respirations. Chest wall normal. Lungs are clear to auscultation. No wheezing, crackles, or rhonchi.  Cardiovascular Exam: Regular rate and rhythm. No murmur, gallop, or rubs.  Musculoskeletal Exam: Back is non-tender, full ROM of upper and lower extremities.  Skin: no rash, warm and dry.    Neurologic Exam: Nonfocal, no tremor. Normal gait.  Psychiatric Exam: Alert - appropriate, normal affect      ASSESSMENT / PLAN:       ICD-10-CM    1. Encounter for Medicare annual wellness exam  Z00.00 citalopram (CELEXA) 20 MG tablet     lisinopril-hydrochlorothiazide (ZESTORETIC) 20-12.5 MG tablet     simvastatin (ZOCOR) 20 MG tablet     Lipid Profile (Chol, Trig, HDL, LDL calc)     Basic metabolic panel   2. Anxiety  F41.9 citalopram (CELEXA) 20 MG tablet   3. Essential hypertension  I10 " "lisinopril-hydrochlorothiazide (ZESTORETIC) 20-12.5 MG tablet     Basic metabolic panel   4. At risk for heart disease  Z91.89 simvastatin (ZOCOR) 20 MG tablet     Lipid Profile (Chol, Trig, HDL, LDL calc)   5. Mixed hyperlipidemia  E78.2 simvastatin (ZOCOR) 20 MG tablet     Lipid Profile (Chol, Trig, HDL, LDL calc)       Patient has stable chronic conditions as noted in A/P including  f Anxiety, Essential hypertension, At risk for heart disease, and Mixed hyperlipidemia were also pertinent to this visit.    These diagnoses were each reviewed for stability and medications were reviewed and refilled, labs ordered and updated.      Patient has been advised of split billing requirements and indicates understanding: Yes  COUNSELING:  Reviewed preventive health counseling, as reflected in patient instructions       Regular exercise       Healthy diet/nutrition    Estimated body mass index is 35.69 kg/m  as calculated from the following:    Height as of this encounter: 1.76 m (5' 9.3\").    Weight as of this encounter: 110.6 kg (243 lb 12.8 oz).      He reports that he has never smoked. He has never used smokeless tobacco.      Appropriate preventive services were discussed with this patient, including applicable screening as appropriate for cardiovascular disease, diabetes, osteopenia/osteoporosis, and glaucoma.  As appropriate for age/gender, discussed screening for colorectal cancer, prostate cancer, breast cancer, and cervical cancer. Checklist reviewing preventive services available has been given to the patient.    Reviewed patients plan of care and provided an AVS. The Basic Care Plan (routine screening as documented in Health Maintenance) for Jarad meets the Care Plan requirement. This Care Plan has been established and reviewed with the Patient.    Counseling Resources:  ATP IV Guidelines  Pooled Cohorts Equation Calculator  Breast Cancer Risk Calculator  Breast Cancer: Medication to Reduce Risk  FRAX Risk " Assessment  ICSI Preventive Guidelines  Dietary Guidelines for Americans, 2010  USDA's MyPlate  ASA Prophylaxis  Lung CA Screening    Edilson Kim MD  [facility]    Identified Health Risks:

## 2021-01-15 LAB
ANION GAP SERPL CALCULATED.3IONS-SCNC: 8 MMOL/L (ref 3–14)
BUN SERPL-MCNC: 20 MG/DL (ref 7–30)
CALCIUM SERPL-MCNC: 8.9 MG/DL (ref 8.5–10.1)
CHLORIDE SERPL-SCNC: 106 MMOL/L (ref 94–109)
CHOLEST SERPL-MCNC: 135 MG/DL
CO2 SERPL-SCNC: 23 MMOL/L (ref 20–32)
CREAT SERPL-MCNC: 0.76 MG/DL (ref 0.66–1.25)
GFR SERPL CREATININE-BSD FRML MDRD: >90 ML/MIN/{1.73_M2}
GLUCOSE SERPL-MCNC: 91 MG/DL (ref 70–99)
HDLC SERPL-MCNC: 44 MG/DL
LDLC SERPL CALC-MCNC: 69 MG/DL
NONHDLC SERPL-MCNC: 91 MG/DL
POTASSIUM SERPL-SCNC: 3.7 MMOL/L (ref 3.4–5.3)
SODIUM SERPL-SCNC: 137 MMOL/L (ref 133–144)
TRIGL SERPL-MCNC: 110 MG/DL

## 2021-02-16 ENCOUNTER — DOCUMENTATION ONLY (OUTPATIENT)
Dept: OTHER | Facility: CLINIC | Age: 66
End: 2021-02-16

## 2022-01-25 ENCOUNTER — MYC MEDICAL ADVICE (OUTPATIENT)
Dept: FAMILY MEDICINE | Facility: CLINIC | Age: 67
End: 2022-01-25
Payer: MEDICARE

## 2022-03-02 DIAGNOSIS — Z00.00 ENCOUNTER FOR MEDICARE ANNUAL WELLNESS EXAM: ICD-10-CM

## 2022-03-02 DIAGNOSIS — I10 ESSENTIAL HYPERTENSION: ICD-10-CM

## 2022-03-02 RX ORDER — LISINOPRIL AND HYDROCHLOROTHIAZIDE 12.5; 2 MG/1; MG/1
TABLET ORAL
Qty: 1 TABLET | Refills: 0 | OUTPATIENT
Start: 2022-03-02

## 2022-03-02 NOTE — TELEPHONE ENCOUNTER
One month supply sent 2/4/22.  Due for annual wellness visit.    Last 1/14/21  Patient cancelled his appointment with JF on 2/28    [employer] message sent to patient asking him to schedule appointment.  Ava Gaitan RN

## 2022-03-07 RX ORDER — LISINOPRIL AND HYDROCHLOROTHIAZIDE 12.5; 2 MG/1; MG/1
TABLET ORAL
Qty: 60 TABLET | Refills: 0 | Status: SHIPPED | OUTPATIENT
Start: 2022-03-07

## 2023-01-26 ENCOUNTER — LAB REQUISITION (OUTPATIENT)
Dept: LAB | Facility: CLINIC | Age: 68
End: 2023-01-26

## 2023-01-26 PROCEDURE — 88342 IMHCHEM/IMCYTCHM 1ST ANTB: CPT | Mod: TC | Performed by: PATHOLOGY

## 2024-11-24 ENCOUNTER — HOSPITAL ENCOUNTER (OUTPATIENT)
Facility: CLINIC | Age: 69
Setting detail: OBSERVATION
Discharge: HOME | End: 2024-11-25
Attending: EMERGENCY MEDICINE | Admitting: PSYCHIATRY & NEUROLOGY
Payer: COMMERCIAL

## 2024-11-24 DIAGNOSIS — F41.1 GAD (GENERALIZED ANXIETY DISORDER): ICD-10-CM

## 2024-11-24 DIAGNOSIS — Z00.00 ENCOUNTER FOR MEDICARE ANNUAL WELLNESS EXAM: ICD-10-CM

## 2024-11-24 DIAGNOSIS — F41.9 ANXIETY: ICD-10-CM

## 2024-11-24 DIAGNOSIS — F19.980: ICD-10-CM

## 2024-11-24 LAB
ALBUMIN SERPL BCG-MCNC: 4 G/DL (ref 3.5–5.2)
ALBUMIN UR-MCNC: NEGATIVE MG/DL
ALP SERPL-CCNC: 55 U/L (ref 40–150)
ALT SERPL W P-5'-P-CCNC: 57 U/L (ref 0–70)
ANION GAP SERPL CALCULATED.3IONS-SCNC: 12 MMOL/L (ref 7–15)
APPEARANCE UR: CLEAR
AST SERPL W P-5'-P-CCNC: 33 U/L (ref 0–45)
BILIRUB SERPL-MCNC: 0.5 MG/DL
BILIRUB UR QL STRIP: NEGATIVE
BUN SERPL-MCNC: 14.5 MG/DL (ref 8–23)
CALCIUM SERPL-MCNC: 9.5 MG/DL (ref 8.8–10.4)
CHLORIDE SERPL-SCNC: 98 MMOL/L (ref 98–107)
COLOR UR AUTO: ABNORMAL
CREAT SERPL-MCNC: 1.04 MG/DL (ref 0.67–1.17)
EGFRCR SERPLBLD CKD-EPI 2021: 77 ML/MIN/1.73M2
ERYTHROCYTE [DISTWIDTH] IN BLOOD BY AUTOMATED COUNT: 12.9 % (ref 10–15)
GLUCOSE SERPL-MCNC: 101 MG/DL (ref 70–99)
GLUCOSE UR STRIP-MCNC: NEGATIVE MG/DL
HCO3 SERPL-SCNC: 26 MMOL/L (ref 22–29)
HCT VFR BLD AUTO: 46 % (ref 40–53)
HGB BLD-MCNC: 16.2 G/DL (ref 13.3–17.7)
HGB UR QL STRIP: NEGATIVE
KETONES UR STRIP-MCNC: NEGATIVE MG/DL
LEUKOCYTE ESTERASE UR QL STRIP: NEGATIVE
MCH RBC QN AUTO: 31 PG (ref 26.5–33)
MCHC RBC AUTO-ENTMCNC: 35.2 G/DL (ref 31.5–36.5)
MCV RBC AUTO: 88 FL (ref 78–100)
MUCOUS THREADS #/AREA URNS LPF: PRESENT /LPF
NITRATE UR QL: NEGATIVE
PH UR STRIP: 5.5 [PH] (ref 5–7)
PLATELET # BLD AUTO: 240 10E3/UL (ref 150–450)
POTASSIUM SERPL-SCNC: 3.6 MMOL/L (ref 3.4–5.3)
PROT SERPL-MCNC: 7 G/DL (ref 6.4–8.3)
RBC # BLD AUTO: 5.23 10E6/UL (ref 4.4–5.9)
RBC URINE: 1 /HPF
SODIUM SERPL-SCNC: 136 MMOL/L (ref 135–145)
SP GR UR STRIP: 1.02 (ref 1–1.03)
SQUAMOUS EPITHELIAL: <1 /HPF
TSH SERPL DL<=0.005 MIU/L-ACNC: 1.35 UIU/ML (ref 0.3–4.2)
UROBILINOGEN UR STRIP-MCNC: NORMAL MG/DL
WBC # BLD AUTO: 8.5 10E3/UL (ref 4–11)
WBC URINE: 3 /HPF

## 2024-11-24 PROCEDURE — 36415 COLL VENOUS BLD VENIPUNCTURE: CPT | Performed by: NURSE PRACTITIONER

## 2024-11-24 PROCEDURE — 99285 EMERGENCY DEPT VISIT HI MDM: CPT

## 2024-11-24 PROCEDURE — 80053 COMPREHEN METABOLIC PANEL: CPT | Performed by: NURSE PRACTITIONER

## 2024-11-24 PROCEDURE — 81003 URINALYSIS AUTO W/O SCOPE: CPT | Performed by: NURSE PRACTITIONER

## 2024-11-24 PROCEDURE — 85014 HEMATOCRIT: CPT | Performed by: NURSE PRACTITIONER

## 2024-11-24 PROCEDURE — 84443 ASSAY THYROID STIM HORMONE: CPT | Performed by: NURSE PRACTITIONER

## 2024-11-24 PROCEDURE — 99223 1ST HOSP IP/OBS HIGH 75: CPT | Mod: AI | Performed by: NURSE PRACTITIONER

## 2024-11-24 PROCEDURE — 82310 ASSAY OF CALCIUM: CPT | Performed by: NURSE PRACTITIONER

## 2024-11-24 PROCEDURE — 250N000013 HC RX MED GY IP 250 OP 250 PS 637: Performed by: EMERGENCY MEDICINE

## 2024-11-24 PROCEDURE — 250N000013 HC RX MED GY IP 250 OP 250 PS 637: Performed by: NURSE PRACTITIONER

## 2024-11-24 PROCEDURE — G0378 HOSPITAL OBSERVATION PER HR: HCPCS

## 2024-11-24 RX ORDER — CITALOPRAM HYDROBROMIDE 20 MG/1
20 TABLET ORAL DAILY
Status: DISCONTINUED | OUTPATIENT
Start: 2024-11-25 | End: 2024-11-25 | Stop reason: HOSPADM

## 2024-11-24 RX ORDER — BUSPIRONE HYDROCHLORIDE 5 MG/1
5 TABLET ORAL 2 TIMES DAILY
COMMUNITY
End: 2024-11-25

## 2024-11-24 RX ORDER — MULTIVIT WITH MINERALS/LUTEIN
1 TABLET ORAL DAILY
COMMUNITY

## 2024-11-24 RX ORDER — DOCUSATE SODIUM 100 MG/1
100 CAPSULE, LIQUID FILLED ORAL 2 TIMES DAILY
Status: DISCONTINUED | OUTPATIENT
Start: 2024-11-24 | End: 2024-11-25 | Stop reason: HOSPADM

## 2024-11-24 RX ORDER — MIRTAZAPINE 7.5 MG/1
7.5 TABLET, FILM COATED ORAL AT BEDTIME
Status: DISCONTINUED | OUTPATIENT
Start: 2024-11-24 | End: 2024-11-25 | Stop reason: HOSPADM

## 2024-11-24 RX ORDER — HYDROCHLOROTHIAZIDE 25 MG/1
25 TABLET ORAL DAILY
Status: DISCONTINUED | OUTPATIENT
Start: 2024-11-25 | End: 2024-11-25 | Stop reason: HOSPADM

## 2024-11-24 RX ORDER — LORAZEPAM 1 MG/1
1 TABLET ORAL ONCE
Status: COMPLETED | OUTPATIENT
Start: 2024-11-24 | End: 2024-11-24

## 2024-11-24 RX ORDER — PROPRANOLOL HYDROCHLORIDE 10 MG/1
20 TABLET ORAL 3 TIMES DAILY PRN
Status: DISCONTINUED | OUTPATIENT
Start: 2024-11-24 | End: 2024-11-25 | Stop reason: HOSPADM

## 2024-11-24 RX ORDER — PANTOPRAZOLE SODIUM 40 MG/1
40 TABLET, DELAYED RELEASE ORAL
Status: DISCONTINUED | OUTPATIENT
Start: 2024-11-25 | End: 2024-11-25 | Stop reason: HOSPADM

## 2024-11-24 RX ORDER — LISINOPRIL AND HYDROCHLOROTHIAZIDE 12.5; 2 MG/1; MG/1
2 TABLET ORAL DAILY
Status: DISCONTINUED | OUTPATIENT
Start: 2024-11-25 | End: 2024-11-24

## 2024-11-24 RX ORDER — GABAPENTIN 300 MG/1
300 CAPSULE ORAL 3 TIMES DAILY PRN
Status: DISCONTINUED | OUTPATIENT
Start: 2024-11-24 | End: 2024-11-25 | Stop reason: HOSPADM

## 2024-11-24 RX ORDER — LISINOPRIL 10 MG/1
40 TABLET ORAL DAILY
Status: DISCONTINUED | OUTPATIENT
Start: 2024-11-25 | End: 2024-11-25 | Stop reason: HOSPADM

## 2024-11-24 RX ORDER — LORAZEPAM 0.5 MG/1
.25-.5 TABLET ORAL EVERY 8 HOURS PRN
COMMUNITY

## 2024-11-24 RX ORDER — DOCUSATE SODIUM 100 MG/1
100 CAPSULE, LIQUID FILLED ORAL 2 TIMES DAILY
COMMUNITY

## 2024-11-24 RX ORDER — LORAZEPAM 0.5 MG/1
0.5 TABLET ORAL EVERY 8 HOURS PRN
Status: DISCONTINUED | OUTPATIENT
Start: 2024-11-24 | End: 2024-11-25 | Stop reason: HOSPADM

## 2024-11-24 RX ADMIN — MIRTAZAPINE 7.5 MG: 7.5 TABLET, FILM COATED ORAL at 21:41

## 2024-11-24 RX ADMIN — LORAZEPAM 1 MG: 1 TABLET ORAL at 10:27

## 2024-11-24 RX ADMIN — GABAPENTIN 300 MG: 300 CAPSULE ORAL at 16:45

## 2024-11-24 RX ADMIN — PROPRANOLOL HYDROCHLORIDE 20 MG: 10 TABLET ORAL at 16:45

## 2024-11-24 RX ADMIN — DOCUSATE SODIUM 100 MG: 100 CAPSULE, LIQUID FILLED ORAL at 21:41

## 2024-11-24 ASSESSMENT — ACTIVITIES OF DAILY LIVING (ADL)
ADLS_ACUITY_SCORE: 0

## 2024-11-24 ASSESSMENT — COLUMBIA-SUICIDE SEVERITY RATING SCALE - C-SSRS
2. HAVE YOU ACTUALLY HAD ANY THOUGHTS OF KILLING YOURSELF IN THE PAST MONTH?: NO
1. IN THE PAST MONTH, HAVE YOU WISHED YOU WERE DEAD OR WISHED YOU COULD GO TO SLEEP AND NOT WAKE UP?: NO
6. HAVE YOU EVER DONE ANYTHING, STARTED TO DO ANYTHING, OR PREPARED TO DO ANYTHING TO END YOUR LIFE?: NO

## 2024-11-24 NOTE — ED NOTES
Patient brought over from ED 21 at 11am.  He was with his wife and a friend.  Patient has been dealing with anxiety for  a few weeks.  He took 80mg instead of 8mg of ozempic when he started it and he had nausea and vomiting after that and lost 16lbs.  At that time he became very anxious.  He experiences hot sensations going up his arm and the chest and neck area. He becomes scared and paces and will have difficulty breathing.  He will cry and sob.  This am he stated that he was sobbing for an hour.  He also has been pacing.  He was given ativan .25-.5 every 8 hours as needed.  He stated it did not help.  He then las week was started on 5mg of buspar bid but stated that it has not helped.  He is here today looking for more help.  He will see a psych provider for the 1st time on dec 6th.  He also has just started with therapy.  He denies suicidal ideation.  He was teary eyed during intake and has a flat affect. He is pleasant and cooperative.Nursing and risk assessments completed.  Assessments reviewed with LMHP and physician. Video monitoring in progress, patient informed.  Admission information reviewed with patient. Patient given a tour of [facility] and instructions on using the facility. Questions regarding [facility] addressed.

## 2024-11-24 NOTE — ED PROVIDER NOTES
[facility] - Initial Psychiatric Observation Note  [facility] Emergency Department  Observation Initiation Date: Nov 24, 2024    Jarad Javier MRN: [id number]   Age: 69 year old YOB: 1955     History     Chief Complaint   Patient presents with    Mental Health Problem    Anxiety     HPI  Jarad Javier is a 69 year old male with a past history notable for anxiety, hypertension, GERD who presented to the emergency department for evaluation of worsening anxiety symptoms over the last 3 weeks.  Patient was noted to have unintentionally injected himself with 10 times the prescribed amount of Ozempic, which appears to correlate with the timing of his worsening anxiety symptoms.  Patient was medically evaluated in the emergency department and determined to be medically stable for transfer to [facility] for further psychiatric assessment.  Patient is nearing 7 hours in emergency care.  Patient received a 1 mg dose of lorazepam prior to transfer to [facility], which patient found to be effective for several hours.  On approach, patient is observed to be pacing around the milieu, appearing tearful.  He agrees to meet with provider in the consult room.  He appears quite restless and anxious.  He indicates that his anxiety symptoms began to worsen shortly after his unintentional overdose on Ozempic.  He reports that this is the worst his anxiety has ever felt.  Recalls heightened anxiety symptoms about 9 years ago, which responded well to initiation of citalopram.  His anxiety symptoms have been more or less manageable over the last 9 years until recently.  He currently feels as though he is unable to tolerate his current level of anxiety.  He is becoming scared and worried that he is going to feel like this forever, and indicates that he cannot live this way.  He was prescribed lorazepam 0.5 mg every 8 hours as needed by his outpatient PCP, but this has been barely touching his anxiety symptoms.  He  received a 1 mg dose in the emergency department, which did feel more helpful, reducing the intensity from a 10 down to a 3 for several hours.  Is currently beginning to wear off, and he is starting to feel the intense anxiety once again.  He feels anxiety as a tension in his abdomen.  Last night, reports he had a very difficult time sleeping.  Previous nights, he felt as though he was sleeping okay, with some mild restlessness.  He has lost about 16 pounds over the last few weeks, dealing with nausea and lack of appetite.  He is not finding his usual coping strategies of exercise and meditation to be effective.  He is having a much more difficult time functioning as results of the severe anxiety symptoms.  He is agreeable to alternative medications.  Reports he initiated buspirone about 5 days ago, and feels as though his anxiety symptoms may have heightened since that time.  He denies any suicidal or homicidal thoughts, plans, or intent.  There is no evidence for psychosis or venkat.  Patient agreeable to remain on observation overnight.    Past Medical History  Past Medical History:   Diagnosis Date    Anxiety 2013    I take Citalopram    Arthritis 2002    Some in hands but mostly feet, big toe joints    Gastroesophageal reflux disease 2012?    Take antacids when needed    Hypertension 1970's    Borderline high to high, white coat syndrome     Past Surgical History:   Procedure Laterality Date    ADENOIDECTOMY  1960's    When I was about 8    COLONOSCOPY      TONSILLECTOMY  1960's    When I was about 8     busPIRone (BUSPAR) 5 MG tablet  cholecalciferol (VITAMIN D3) 5000 UNITS CAPS capsule  citalopram (CELEXA) 20 MG tablet  coenzyme Q-10 10 MG CAPS  docusate sodium (COLACE) 100 MG capsule  lisinopril-hydrochlorothiazide (ZESTORETIC) 20-12.5 MG tablet  LORazepam (ATIVAN) 0.5 MG tablet  multivitamin (CENTRUM SILVER) tablet  omeprazole (PRILOSEC) 20 MG DR capsule  fluticasone (FLONASE) 50 MCG/ACT spray  FLUZONE  "HIGH-DOSE 0.5 ML injection  simvastatin (ZOCOR) 20 MG tablet      No Known Allergies  Family History  Family History   Problem Relation Age of Onset    Hypertension Mother     Asthma Mother     Glaucoma No family hx of     Macular Degeneration No family hx of     Diabetes No family hx of      Social History   Social History     Tobacco Use    Smoking status: Never    Smokeless tobacco: Never   Substance Use Topics    Alcohol use: Yes     Comment: Very occasional    Drug use: No          Review of Systems  A medically appropriate review of systems was performed with pertinent positives and negatives noted in the HPI, and all other systems negative.    Physical Examination   BP: (!) 135/90  Pulse: 96  Temp: 98.6  F (37  C)  Resp: 18  Height: 177.8 cm (5' 10\")  Weight: 113.4 kg (250 lb)  SpO2: 97 %    Physical Exam  General: Appears stated age.   Neuro: Alert and fully oriented. Extremities appear to demonstrate normal strength on visual inspection.   Integumentary/Skin: no rash visualized, normal color    Psychiatric Examination   Appearance: awake, alert, adequately groomed, appeared as age stated, and casually dressed  Attitude:  cooperative  Eye Contact:  fair  Mood:  anxious  Affect:  mood congruent, intensity is heightened, and tearful  Speech:  clear, coherent and normal prosody  Psychomotor Behavior:  no evidence of tardive dyskinesia, dystonia, or tics; restless  Thought Process:  linear and goal oriented  Associations:  no loose associations  Thought Content:  no evidence of suicidal ideation or homicidal ideation and no evidence of psychotic thought  Insight:  fair  Judgement:  intact  Oriented to:  time, person, and place  Attention Span and Concentration:  fair  Recent and Remote Memory:  intact  Language: able to name/identify objects without impairment  Fund of Knowledge: intact with awareness of current and past events    ED Course     ED Course as of 11/24/24 1649   [place] Nov 24, 2024   1011 I obtained " history and examined the patient as noted above.         Labs Ordered and Resulted from Time of ED Arrival to Time of ED Departure - No data to display    Assessments & Plan (with Medical Decision Making)   Patient presenting with severe anxiety symptoms worsening over the last 3 weeks which appears to correlate with an accidental overdose of Ozempic after unintentionally injecting himself with 10 times the prescribed amount.  Patient with history of LAWRENCE under good control for the last 9 years on citalopram monotherapy.  PCP initiated buspirone about 5 days ago, and patient feels increasingly anxious since that time.  Has been utilizing lorazepam 0.5 mg every 8 hours at home recently.  Patient agreeable to medication recommendations as described below as well as an overnight stay on observation. Nursing notes reviewed noting no acute issues.     I have reviewed the assessment completed by the [facility].     During the observation period, the patient did not require medications for agitation, and did not require restraints/seclusion for patient and/or provider safety.     The patient was found to have a psychiatric condition that would benefit from an observation stay in the emergency department for further psychiatric stabilization and/or coordination of a safe disposition. The observation plan includes serial assessments of psychiatric condition, potential administration of medications if indicated, further disposition pending the patient's psychiatric course during the monitoring period.     Preliminary diagnosis:    ICD-10-CM    1. LAWRENCE (generalized anxiety disorder)  F41.1       2. 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  Citalopram had previously managed his baseline anxiety symptoms.  -Will augment with mirtazapine 7.5 mg nightly beginning  tonight for additional treatment of anxiety exacerbation as well as insomnia  -Trial gabapentin 300 mg 3 times daily as needed for severe anxiety symptoms.  Will also order propranolol 20 mg 3 times daily as needed.  Vital signs reviewed, noting patient has been tachycardic and hypertensive today.  Continue to monitor vital signs with use of propranolol.  -Stop buspirone due to concern that he has felt increasingly anxious since initiation  -Will order his PTA lorazepam 0.5 mg every 8 hours as needed third line if above interventions are ineffective  -Will check CBC, CMP, TSH, and urinalysis to rule out medical contribution to current symptom intensity.  Reviewed recent labs in Care Everywhere -AST mildly elevated at 57, ALT elevated at 100.  Liver enzymes have been elevated above baseline for the last 3 weeks.  Potassium slightly low at 3.4 two weeks ago.  Anion gap 19.  BUN 28.  Creatinine within normal limits.  -Patient will be registered observation status overnight for further management of anxiety symptoms and medication trials.  Reassess tomorrow.    --  Paco Morillo CNP   [facility] EMERGENCY DEPT  EmPATH Unit      Paco Morillo CNP  11/24/24 1519

## 2024-11-24 NOTE — PROGRESS NOTES
Patient working on puzzle at table in milieu. RN checked in on patient. Patient stated that the dose of Ativan was helpful for his anxiety. Patient waiting to meet with psychiatric provider. Denies need for anything at this time.

## 2024-11-24 NOTE — ED NOTES
[facility]  ED to EMPATH Checklist:      Goal for EMPATH: Anxiety management    Current Behavior: Calm and Cooperative    Safety Concerns: None    Legal Hold Status: Voluntary    Medically Cleared by ED provider: Yes    Patient Therapeutically Searched: N/A    Belongings: Remain with patient    Independent Ambulation at Baseline: Yes/No: Yes    Participates in Care/Conversation: Yes/No: Yes    Patient Informed about EMPATH: Yes/No: Yes    DEC: Ordered and pending    Patient Ready to be Transferred to EMPATH? Yes

## 2024-11-24 NOTE — PLAN OF CARE
Jarad Javier  November 24, 2024  Plan of Care Hand-off Note     Patient Care Path: observation    Plan for Care:   Pt presents in ED for increased anxiety and panic attacks. His anxiety symptoms have been managed by citalopram for about 9 years. A few weeks ago, Pt accidentally overdosed on his firt ozempic injection, self administering 80 units instead of 8. He experienced several days of nausea and vomiting, and  began having increased anxiety and panic attacks. He was prescribed ativan in ED, but his PCP who manages his meds decreased dose. PCP then prescribed buspar. Pt does not feel like this is reducing the anxiety and he has started to experience episodes of uncontrollable crying. He denies SI/HI/SA. He would like to have medications evaluated and adjusted by psychiatric provider, and to stay for observation on EmPATH for medication changes as he is rather anxious about health and medications since the recent ozempic over-dose and med changes by PCP.    Identified Goals and Safety Issues:    Reduce anxiety    Overview:  Pt would like to stay on EmPATH for observation and medication adjustment. He is particularly concerned/anxious about meds following accidental ozempic overdose           Legal Status: Legal Status at Admission: Voluntary/Patient has signed consent for treatment       MAURICE Pratt

## 2024-11-24 NOTE — ED TRIAGE NOTES
Patient with baseline anxiety and managed well with citalopram but after Ozempic accidental overdose  3 weeks (took 80 units instead of 8 units), had severe side effect of nausea vomiting, lost 16 lb since. Since the incident having severe level of anxiety and panic attacks, seen at [facility] 2 weeks ago and adjusted medications but nothing is helping. Here to go to Empath unit.      Triage Assessment (Adult)       Row Name 11/24/24 0955          Triage Assessment    Airway WDL WDL        Respiratory WDL    Respiratory WDL WDL        Skin Circulation/Temperature WDL    Skin Circulation/Temperature WDL WDL        Cardiac WDL    Cardiac WDL WDL        Peripheral/Neurovascular WDL    Peripheral Neurovascular WDL WDL        Cognitive/Neuro/Behavioral WDL    Cognitive/Neuro/Behavioral WDL WDL

## 2024-11-24 NOTE — CONSULTS
Diagnostic Evaluation Consultation  Crisis Assessment    Patient Name: Jarad Javier  Age:  69 year old  Legal Sex: male  Gender Identity: male  Pronouns:   Race: White  Ethnicity: Not  or   Language: English      Patient was assessed: In person   Crisis Assessment Start Date: 11/24/24  Crisis Assessment Start Time: 1200  Crisis Assessment Stop Time: 1245  Patient location: [facility] EMERGENCY DEPT                             [id number]    Referral Data and Chief Complaint  Jarad Javier presents to the ED with family/friends. Patient is presenting to the ED for the following concerns: Anxiety.   Factors that make the mental health crisis life threatening or complex are:  Pt has been seen in ED and been in contact with his PCP for increasing anxiety over the past few weeks. He was prescribed ativan in ED (.5mg every 6 hours). His PCP reduced the frequency (.5mg every 8 hours) which did not provide adequate relief of symptoms. Pt sent message to PCP, requesting increased dose if on an 8 hour schedule. Pt's PCP ordered Buspar. Pt did not get message from PCP and did not learn of new prescription until he got an alert from pharmacy that it was ready. This exacerbated his anxiety, but he did start the Buspar. He reports anxiety has increased and that he has been having episodes of uncontrollable crying (which is unusual for him)..      Informed Consent and Assessment Methods  Explained the crisis assessment process, including applicable information disclosures and limits to confidentiality, assessed understanding of the process, and obtained consent to proceed with the assessment.  Assessment methods included conducting a formal interview with patient, review of medical records, collaboration with medical staff, and obtaining relevant collateral information from family and community providers when available.  : done     Patient response to interventions: eager to participate,  verbalizes understanding  Coping skills were attempted to reduce the crisis:  meditaion, breathing, distraction, taking prescribed meds     History of the Crisis   Pt reports a history of anxiety that has been generally managed by citalopram for about 9 years. About 3 weeks ago, he started ozempic from an online service. He was supposed to take 8 units and he unintentionally injected 80. He experienced several days of nausea and vomiting, for which he was seen twice in the ED. During this time, he also experienced a dramatic increase in anxiety.    Brief Psychosocial History  Family:  , Children no  Support System:  Wife, Friend  Employment Status:  retired  Source of Income:  pension/longterm  Financial Environmental Concerns:  none  Current Hobbies:     Barriers in Personal Life:       Significant Clinical History  Current Anxiety Symptoms:  panic attack, excessive worry, anxious  Current Depression/Trauma:  crying or feels like crying, impaired decision making, helplessness  Current Somatic Symptoms:  excessive worry, anxious  Current Psychosis/Thought Disturbance:   (none noted)  Current Eating Symptoms:  loss of appetite  Chemical Use History:  Alcohol: None  Benzodiazepines: None  Opiates: None  Cocaine: None  Marijuana: None  Other Use: None   Past diagnosis:  Anxiety Disorder  Family history:  Anxiety Disorder, Substance Use Disorder  Past treatment:  Individual therapy, Primary Care, Psychiatric Medication Management  Details of most recent treatment:  Pt's meds have been managed by PCP and anxiety has been generally well-managed. He has had multiple ED visits over the past few weeks for nausea and vomiting related to unintentional ozempic overdose. He does have an appointment set up with new psychiatrist on December 6.  Other relevant history:          Collateral Information  Is there collateral information: No     Collateral information name, relationship, phone number:       What happened today:        What is different about patient's functioning:       Concern about alcohol/drug use:      What do you think the patient needs:      Has patient made comments about wanting to kill themselves/others:      If d/c is recommended, can they take part in safety/aftercare planning:       Additional collateral information:        Risk Assessment  [facility] Suicide Severity Rating Scale Full Clinical Version:  Suicidal Ideation  Q6 Suicide Behavior (Lifetime): no          [facility] Suicide Severity Rating Scale Recent:   Suicidal Ideation (Recent)  Q1 Wished to be Dead (Past Month): no  Q2 Suicidal Thoughts (Past Month): no  Level of Risk per Screen: no risks indicated          Environmental or Psychosocial Events: other (see comment) (accidental overdose of ozempic)  Protective Factors: Protective Factors: intact marriage or domestic partnership, responsibilities and duties to others, including pets and children, lives in a responsibly safe and stable environment, good treatment engagement, sense of importance of health and wellness, help seeking, sense of self-efficacy and/or positive self-esteem, constructive use of leisure time, enjoyable activities, resilience    Does the patient have thoughts of harming others? Feels Like Hurting Others: no  Previous Attempt to Hurt Others: no  Is the patient engaging in sexually inappropriate behavior?: no    Is the patient engaging in sexually inappropriate behavior?  no        Mental Status Exam   Affect: Labile  Appearance: Appropriate  Attention Span/Concentration: Attentive  Eye Contact: Engaged    Fund of Knowledge: Appropriate   Language /Speech Content: Fluent  Language /Speech Volume: Normal  Language /Speech Rate/Productions: Normal  Recent Memory: Intact  Remote Memory: Intact  Mood: Angry  Orientation to Person: Yes   Orientation to Place: Yes  Orientation to Time of Day: Yes  Orientation to Date: Yes     Situation (Do they understand why they are here?):  Yes  Psychomotor Behavior: Normal  Thought Content: Clear  Thought Form: Intact     Mini-Cog Assessment  Number of Words Recalled:    Clock-Drawing Test:     Three Item Recall:    Mini-Cog Total Score:       Medication  Psychotropic medications:   Medication Orders - Psychiatric (From admission, onward)      None             Current Care Team  Patient Care Team:  Armen Smith MD as PCP - General (Family Medicine)    Diagnosis  Patient Active Problem List   Diagnosis Code    Allergic rhinitis J30.9    Anxiety F41.9    Chronic rhinitis J31.0    Essential hypertension I10    Screen for colon cancer Z12.11    Dysfunction of eustachian tube H69.90    Dysphonia R49.0    Obesity (BMI 35.0-39.9) with comorbidity (H) E66.01       Primary Problem This Admission  Active Hospital Problems    Anxiety        Clinical Summary and Substantiation of Recommendations   Pt presents in ED for increased anxiety and panic attacks. His anxiety symptoms have been managed by citalopram for about 9 years. A few weeks ago, Pt accidentally overdosed on his firt ozempic injection, self administering 80 units instead of 8. He experienced several days of nausea and vomiting, and  began having increased anxiety and panic attacks. He was prescribed ativan in ED, but his PCP who manages his meds decreased dose. PCP then prescribed buspar. Pt does not feel like this is reducing the anxiety and he has started to experience episodes of uncontrollable crying. He denies SI/HI/SA. He would like to have medications evaluated and adjusted by psychiatric provider, and to stay for observation on EmPATH for medication changes as he is rather anxious about health and medications since the recent ozempic over-dose and med changes by PCP.                          Patient coping skills attempted to reduce the crisis:  meditaion, breathing, distraction, taking prescribed meds    Disposition  Recommended disposition: Observation        Reviewed case and  recommendations with attending provider. Attending Name: uGi BLACK       Attending concurs with disposition: yes       Patient and/or validated legal guardian concurs with disposition:   yes       Final disposition:  observation    Legal status on admission: Voluntary/Patient has signed consent for treatment    Assessment Details   Total duration spent with the patient: 45 min     CPT code(s) utilized: 02614 - Psychotherapy for Crisis - 60 (30-74*) min    MAURICE Pratt, Psychotherapist  [facility] - Triage & Transition Services  Callback: [phone]
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
## 2024-11-25 VITALS
OXYGEN SATURATION: 97 % | RESPIRATION RATE: 16 BRPM | HEIGHT: 70 IN | DIASTOLIC BLOOD PRESSURE: 85 MMHG | HEART RATE: 76 BPM | TEMPERATURE: 97.8 F | WEIGHT: 254.2 LBS | BODY MASS INDEX: 36.39 KG/M2 | SYSTOLIC BLOOD PRESSURE: 149 MMHG

## 2024-11-25 PROCEDURE — G0378 HOSPITAL OBSERVATION PER HR: HCPCS

## 2024-11-25 PROCEDURE — 99239 HOSP IP/OBS DSCHRG MGMT >30: CPT | Performed by: PSYCHIATRY & NEUROLOGY

## 2024-11-25 PROCEDURE — 250N000013 HC RX MED GY IP 250 OP 250 PS 637: Performed by: NURSE PRACTITIONER

## 2024-11-25 RX ORDER — GABAPENTIN 300 MG/1
300 CAPSULE ORAL 3 TIMES DAILY PRN
Qty: 30 CAPSULE | Refills: 0 | Status: SHIPPED | OUTPATIENT
Start: 2024-11-25

## 2024-11-25 RX ORDER — CITALOPRAM HYDROBROMIDE 20 MG/1
20 TABLET ORAL DAILY
Qty: 30 TABLET | Refills: 0 | Status: SHIPPED | OUTPATIENT
Start: 2024-11-25

## 2024-11-25 RX ORDER — MIRTAZAPINE 7.5 MG/1
15 TABLET, FILM COATED ORAL AT BEDTIME
Qty: 30 TABLET | Refills: 0 | Status: SHIPPED | OUTPATIENT
Start: 2024-11-25

## 2024-11-25 RX ADMIN — DOCUSATE SODIUM 100 MG: 100 CAPSULE, LIQUID FILLED ORAL at 08:22

## 2024-11-25 RX ADMIN — CITALOPRAM HYDROBROMIDE 20 MG: 20 TABLET ORAL at 08:22

## 2024-11-25 RX ADMIN — HYDROCHLOROTHIAZIDE 25 MG: 25 TABLET ORAL at 08:22

## 2024-11-25 RX ADMIN — PANTOPRAZOLE SODIUM 40 MG: 40 TABLET, DELAYED RELEASE ORAL at 08:23

## 2024-11-25 RX ADMIN — LISINOPRIL 40 MG: 10 TABLET ORAL at 08:22

## 2024-11-25 ASSESSMENT — ACTIVITIES OF DAILY LIVING (ADL)
ADLS_ACUITY_SCORE: 0
ADLS_ACUITY_SCORE: 41
ADLS_ACUITY_SCORE: 41
ADLS_ACUITY_SCORE: 0
ADLS_ACUITY_SCORE: 41
ADLS_ACUITY_SCORE: 0
ADLS_ACUITY_SCORE: 41

## 2024-11-25 ASSESSMENT — COLUMBIA-SUICIDE SEVERITY RATING SCALE - C-SSRS
TOTAL  NUMBER OF ABORTED OR SELF INTERRUPTED ATTEMPTS SINCE LAST CONTACT: NO
6. HAVE YOU EVER DONE ANYTHING, STARTED TO DO ANYTHING, OR PREPARED TO DO ANYTHING TO END YOUR LIFE?: NO
SUICIDE, SINCE LAST CONTACT: NO
TOTAL  NUMBER OF INTERRUPTED ATTEMPTS SINCE LAST CONTACT: NO
2. HAVE YOU ACTUALLY HAD ANY THOUGHTS OF KILLING YOURSELF?: NO
ATTEMPT SINCE LAST CONTACT: NO
1. SINCE LAST CONTACT, HAVE YOU WISHED YOU WERE DEAD OR WISHED YOU COULD GO TO SLEEP AND NOT WAKE UP?: NO

## 2024-11-25 NOTE — DISCHARGE INSTRUCTIONS
Ongoing SW/CM Assessment/Plan of Care Note     See SW/CM flowsheets for goals and other objective data.    Patient/Family discharge goal (s):  Goal #1: Extended Care Facility discharge arranged  Goal #2: Transportation arranged or issues addressed       PT Recommendation:  Recommendation for Discharge: PT: Intensive therapy program    OT Recommendation:  Recommendations for Discharge: OT: Intensive therapy program    SLP Recommendation:       Disposition:       Progress note:   Plan for discharge remains the same. Patient is from home and plans to go [facility] pending auth. when medically ready. Discharge plan as follows:     Discharge Destination:  [facility]   Writer called [facility] at 0845 Phone # [phone] and left message for Hilary DIXON to follow up on referral and to determine if auth has been obtained. Waiting for return call.     Received call from Hilary and auth has been obtained.      Transportation: patient wants cheapest option for transportation, Writer called:  [employer]- $65.00 patient is agreeable to pay this amount  Sunday - $90.00   [employer]- $100.00      Completed ADA card: N/A     Medical Appointments: 6/12/19 post op visit     Hospital Discharge Appeal Notices: N/A     Medications will be filled by facility pharmacy and DME provided by facility.         Aftercare Plan    Follow up with established providers and supports as scheduled. Continue taking medications as prescribed. Abstain from drugs and alcohol. Utilize your [facility] mental health crisis team as needed. They are available 24/7. Contact information is listed below.     If I am feeling unsafe or I am in a crisis, I will:   Contact my established care providers   Call the [place] Suicide Prevention Lifeline: [phone]   Go to the nearest emergency room   Call 911     Warning signs that I or other people might notice when a crisis is developing for me: changes to sleep, appetite or mood, increased anger, agitation or irritability, feeling depressed or hopeless, spending more time alone or talking less, increased crying, decreased productivity, seeing or hearing things that aren't there, thoughts of not wanting to live anymore or of actually killing myself, thoughts of hurting others    Things I am able to do on my own to cope or help me feel better: watching a favorite tv show or movie, listening to music I enjoy, going outside and breathing fresh air, going for a walk or exercising, taking a shower or bath, a cold or hot beverage, a healthy snack, drawing/coloring/painting, journaling, singing or dancing, deep breathing     I can try practicing square breathing when I begin to feel anxious - inhale through the nose for the count of 4 and the first line on the square. Exhale through the mouth for the count of 4 for the second line of the square. Repeat to complete the square. Repeat the square as many times as needed.    I can also use my five senses to practice mindfulness and grounding. What are five things I can see, four things I can hear, three things I can feel, two things I can smell, and one thing I can taste.     TIPP?stands for?Temperature,?Intense exercise,?Paced breathing, and?Paired muscle relaxation.     TEMPERATURE     When we re upset, our bodies often feel hot. To counter this, splash  your face with cold water, hold an ice cube, or let the car s AC blow on your face. Changing your body temperature will help you cool down--both physically and emotionally.     INTENSE EXERCISE     Do intense exercise to match your intense emotion. You re not a marathon runner? That s okay, you don t need to be. Sprint down to the end of the street, jump in the pool for a few laps, or do jumping jacks until you ve tired yourself out. Increasing oxygen flow helps decrease stress levels. Plus, it s hard to stay dangerously upset when you re exhausted.     PACED BREATHING     Even something as simple as controlling your breath can have a profound impact on reducing emotional pain. There are many different types of breathing exercises. If you have a favorite, breathe it out. If you don t, try a technique called  box breathing . Each breath interval will be four seconds long. Take in air four seconds, hold it in four seconds, breathe out four, and hold four. And then start again. Continue to focus on this breathing pattern until you feel more calm. Steady breathing reduces your body s fight or flight response.     PAIRED MUSCLE RELAXATION     The science of paired muscle relaxation is fascinating. When you tighten a voluntary muscle, relax it, and allow it to rest, the muscle will become more relaxed than it was before it was tightened. Relaxed muscles require less oxygen,?so your breathing and heart rate will slow down. Try this technique by focusing on a group of muscles, such as the muscles in your arms. Tighten the muscles as much as you can for five seconds. Then let go of the tension. Let the muscles relax, and you ll begin to relax, as well.     Things that I am able to do with others to cope or help me feel better: sometimes just talking or spending time with someone else, sharing a meal or having coffee, watching a movie or playing a game, going for a walk or exercising    I can also use community resources  "including mental health hotlines, Critical access hospital crisis teams, or apps.     Things I can use or do for distraction: movies/tv, music, reading, games, drawing/coloring/painting or other art, essential oils, exercise, cleaning/organizing, puzzles, crossword puzzles, word search, Sudoku       I can also download a meditation or relaxation dean, like Calm, Headspace, or Insight Timer (all three offer a free version)    Changes I can make to support my mental health and wellness: Attend scheduled mental health therapy and psychiatric appointments. Take my medications as prescribed. Maintain a daily schedule/routine. Abstain from all mood altering substances, including drugs, alcohol, or medications not currently prescribed to me. Implement a self-care routine.      People in my life that I can ask for help: friends or family, trusted teachers/staff/colleagues, trusted members of my community or place of Mandaeism, mental health crisis lines, or 911    Your Critical access hospital has a mental health crisis team you can call 24/7: [facility] [place] [facility], [phone]    Other things that are important when I m in crisis: to remember that the feelings I am having right now are temporary, and it won't feel like this forever, and that it is okay and important to ask for help    Crisis Lines  Crisis Text Line  Text 796347  You will be connected with a trained live crisis counselor to provide support.    Por espanol, texto  ISAÍAS a 536293 o texto a 442-AYUDAME en [facility] Line  1.800.SUICIDE [9068252]      Community Resources  Fast Tracker  Linking people to mental health and substance use disorder resources  fasttrackermn.org     [place] Mental Health Warm Line  Peer to peer support  Monday thru Saturday, 12 pm to 10 pm  115.905.4664 or 0.469.197.5977  Text \"Support\" to 09922    National [place] on Mental Illness (KEYANNA)  589.723.9236 or 1.888.KEYANNA.HELPS      Mental Health Apps  My3  https://myOctoshapepp.org/    VirtualTattvaeBox  " https://[employer] (MXD3D)/apps/virtual-hope-box/      Additional Information  Today you were seen by a licensed mental health professional through Triage and Transition services, [facility] (P)  for a crisis assessment in the Emergency Department at [facility].  It is recommended that you follow up with your established providers (psychiatrist, mental health therapist, and/or primary care doctor - as relevant) as soon as possible. Coordinators from [facility] will be calling you in the next 24-48 hours to ensure that you have the resources you need.  You can also contact [facility] coordinators directly at [phone]. You may have been scheduled for or offered an appointment with a mental health provider. [facility] maintains an extensive network of licensed behavioral health providers to connect patients with the services they need.  We do not charge providers a fee to participate in our referral network.  We match patients with providers based on a patient's specific needs, insurance coverage, and location.  Our first effort will be to refer you to a provider within your care system, and will utilize providers outside your care system as needed.

## 2024-11-25 NOTE — ED NOTES
All belongings which where brought into the hospital have been returned to patient. Escorted off the unit @ 7741 with staff.     Discharged to home with a friend.

## 2024-11-25 NOTE — PROGRESS NOTES
"Triage and Transition Services Extended Care Reassessment     Patient: Jarad goes by \"Jarad,\" uses she/her pronouns  Date of Service: November 25, 2024  Site of Service: [facility] EMERGENCY DEPT                             EMP11  Patient was seen yes  Mode of Assessment: In person     Reason for Reassessment: other (see comment) (Assess readiness for discharge)    History of Patient's Original Emergency Room Encounter: Pt reports a history of anxiety that has been generally managed by citalopram for about 9 years. About 3 weeks ago, he started ozempic from an online service. He was supposed to take 8 units and he unintentionally injected 80. He experienced several days of nausea and vomiting, for which he was seen twice in the ED. During this time, he also experienced a dramatic increase in anxiety.    Current Patient Presentation: Pt is observed to be sitting in the milieu working on coloring a mandala. Pt engages easily and openly with writer. Pt shares he is feeling much better, noting he believes the medication changes were helpful. Pt shares he's noticing a significant reduction in his anxiety overall, and has been more able to slow down automatic negative thoughts and challenge his anxiety. Pt describes his brain as 'torturing me' as it feels his brain is trying to convince him of things that are real. Pt identify many coping skills he's been practicing, including mindfulness, distress tolerance, and taking PRN medications. Pt expressed readiness to discharge home today.    Presentation Summary: Pt is observed to be sitting in the milieu working on coloring a mandala. Pt engages easily and openly with writer. Pt shares he is feeling much better, noting he believes the medication changes were helpful. Pt shares he's noticing a significant reduction in his anxiety overall, and has been more able to slow down automatic negative thoughts and challenge his anxiety. Pt describes his brain as " 'torturing me' as it feels his brain is trying to convince him of things that are real. Pt identify many coping skills he's been practicing, including mindfulness, distress tolerance, and taking PRN medications. Pt expressed readiness to discharge home today.    Changes Observed Since Initial Assessment: decrease in presenting symptoms    Therapeutic Interventions Provided: Engaged in safety planning, Taught the link between thoughts, feelings, and behaviors., Coached on coping techniques/relaxation skills to help improve distress tolerance and managing intense emotions.    Current Symptoms: excessive worry, anxious   somatic symptoms (abdominal pain, headache, tension), excessive worry        Mental Status Exam   Affect: Appropriate  Appearance: Appropriate  Attention Span/Concentration: Attentive  Eye Contact: Engaged    Fund of Knowledge: Appropriate   Language /Speech Content: Fluent  Language /Speech Volume: Normal  Language /Speech Rate/Productions: Normal  Recent Memory: Intact  Remote Memory: Intact  Mood: Anxious  Orientation to Person: Yes   Orientation to Place: Yes  Orientation to Time of Day: Yes  Orientation to Date: Yes     Situation (Do they understand why they are here?): Yes  Psychomotor Behavior: Normal  Thought Content: Clear  Thought Form: Intact    Treatment Objective(s) Addressed: rapport building, identifying and practicing coping strategies, processing feelings, safety planning, assessing safety, building distress tolerance, building skills    Patient Response to Interventions: eager to participate, verbalizes understanding    Progress Towards Goals:  Patient Reports Symptoms Are: improving  Patient Progress Toward Goals: is making progress  Comment: Pt reports significant improvement in overall level of anxiety    Case Management:      C-SSRS Since Last Contact:   1. Wish to be Dead (Since Last Contact): No  2. Non-Specific Active Suicidal Thoughts (Since Last Contact): No     Actual  Attempt (Since Last Contact): No  Has subject engaged in non-suicidal self-injurious behavior? (Since Last Contact): No  Interrupted Attempts (Since Last Contact): No  Aborted or Self-Interrupted Attempt (Since Last Contact): No  Preparatory Acts or Behavior (Since Last Contact): No  Suicide (Since Last Contact): No     Calculated C-SSRS Risk Score (Since Last Contact): No Risk Indicated    Plan: Final Disposition / Recommended Care Path: discharge  Plan for Care reviewed with assigned Medical Provider: yes  Plan for Care Team Review: provider, RN  Patient and/or validated legal guardian concurs: yes  Clinical Substantiation: Pt presented to the ED due to worsening anxiety following unintentional overdose of Ozempic medication about 3 weeks ago. While in [facility], Pt was able to stabilize acute symptoms after haing medications adjusted and learning new coping skills. Pt is already established with outpatient providers, so did not need any assistance with community resources. Pt was able to engage in safety planning and safely discharge to a less restrictive environment.    Legal Status: Legal Status at Admission: Voluntary/Patient has signed consent for treatment    Session Status: Time session started: 1139  Time session ended: 1206  Session Duration (minutes): 27 minutes  Session Number: 2  Anticipated number of sessions or this episode of care: 2    Session Start Time: 1139  Session Stop Time: 1206  CPT codes: 11638 - Psychotherapy (with patient) - 30 (16-37*) min  Time Spent: 27 minutes      CPT code(s) utilized: 19624 - Psychotherapy (with patient) - 30 (16-37*) min    Diagnosis:   Patient Active Problem List   Diagnosis Code    Allergic rhinitis J30.9    Anxiety F41.9    Chronic rhinitis J31.0    Essential hypertension I10    Screen for colon cancer Z12.11    Dysfunction of eustachian tube H69.90    Dysphonia R49.0    Obesity (BMI 35.0-39.9) with comorbidity (H) E66.01    LAWRENCE (generalized anxiety disorder) F41.1     Drug-induced anxiety disorder (H) F19.980       Primary Problem This Admission: Active Hospital Problems    LAWRNECE (generalized anxiety disorder)      Drug-induced anxiety disorder (H)      Anxiety        FAIZAN ALEJANDRO   Licensed Mental Health Professional (LMHP), [facility]  [phone]

## 2024-11-25 NOTE — ED NOTES
Discharge instructions reviewed with patient including follow-up care plan. Educated on medication regime and advised not to stop prescribed medication without consulting their physician. Reviewed safety plan and outpatient resources/appointments.Verbalized understanding of discharge instructions. Denies SI.

## 2024-11-25 NOTE — ED NOTES
Pt has been sleeping in chair 11 with no interruptions > 6 hrs. No distress noted; breathing even and unlabored.

## 2024-11-25 NOTE — ED NOTES
Patient slept well last night.  He felt good early this am.  Patient was coloring now at 1030 and stated he did have to do some deep breathing and the anxiety went away.  His plan is to try non medication things like ice on his nose, aroma therapy, deep breathing, tea and other things that might be helpful.  He will take meds if those things do not work quickly.

## 2024-11-25 NOTE — ED PROVIDER NOTES
"EmPATH Unit - Psychiatric Observation Discharge Summary  [facility] Emergency Department  Discharge Date: 11/25/2024    Jarad Javier MRN: [id number]   Age: 69 year old YOB: 1955     Brief HPI & Initial ED Course     Chief Complaint   Patient presents with    Mental Health Problem    Anxiety     HPI  Jarad Javier is a 69 year old male with history notable for a generalized anxiety disorder who is currently under observation status on the EmPATH unit, now approaching 28 hours in the emergency department.  Documentation indicates recent symptom worsening following an unintentional overdose of of Ozempic.  His treatment plan is focused on additional interventions aimed at lessening anxiety.  On reassessment today, the patient is happy to report that he is beginning to feel much better regarding his mood and anxiety symptoms.  Anxiety is less intense today and he is able to utilize coping strategies more effectively to address mild to moderate anxiety.  He slept very well last night.  He is not experiencing any side effects to mirtazapine.  He denied suicidal and homicidal thoughts.  Energy is fair.  Appetite is normal.  Concentration is adequate.  He tried a dose of gabapentin yesterday evening and found it helpful at alleviating his anxiety.  Noting these improvements, he interprets readiness to discharge home today.        Physical Examination   BP: (!) 149/85  Pulse: 76  Temp: 97.8  F (36.6  C)  Resp: 16  Height: 177.8 cm (5' 10\")  Weight: 115.3 kg (254 lb 3.2 oz)  SpO2: 97 %    Physical Exam  General: Appears stated age.   Neuro: Alert and fully oriented. Extremities appear to demonstrate normal strength on visual inspection.   Integumentary/Skin: no rash visualized, normal color    Psychiatric Examination   Appearance: awake, alert  Attitude:  cooperative  Eye Contact:  fair  Mood:  better  Affect:  appropriate and in normal range  Speech:  clear, coherent  Psychomotor Behavior:  no " evidence of tardive dyskinesia, dystonia, or tics  Thought Process:  logical and linear  Associations:  no loose associations  Thought Content:  no evidence of suicidal ideation or homicidal ideation and no evidence of psychotic thought  Insight:  fair  Judgement:  fair  Oriented to:  time, person, and place  Attention Span and Concentration:  fair  Recent and Remote Memory:  fair  Language: able to name/identify objects without impairment  Fund of Knowledge: intact with awareness of current and past events    Results     ED Course as of 11/25/24 1329   Sun Nov 24, 2024   1011 I obtained history and examined the patient as noted above.         Labs Ordered and Resulted from Time of ED Arrival to Time of ED Departure   COMPREHENSIVE METABOLIC PANEL - Abnormal       Result Value    Sodium 136      Potassium 3.6      Carbon Dioxide (CO2) 26      Anion Gap 12      Urea Nitrogen 14.5      Creatinine 1.04      GFR Estimate 77      Calcium 9.5      Chloride 98      Glucose 101 (*)     Alkaline Phosphatase 55      AST 33      ALT 57      Protein Total 7.0      Albumin 4.0      Bilirubin Total 0.5     ROUTINE UA WITH MICROSCOPIC REFLEX TO CULTURE - Abnormal    Color Urine Light Yellow      Appearance Urine Clear      Glucose Urine Negative      Bilirubin Urine Negative      Ketones Urine Negative      Specific Gravity Urine 1.016      Blood Urine Negative      pH Urine 5.5      Protein Albumin Urine Negative      Urobilinogen Urine Normal      Nitrite Urine Negative      Leukocyte Esterase Urine Negative      Mucus Urine Present (*)     RBC Urine 1      WBC Urine 3      Squamous Epithelials Urine <1     CBC WITH PLATELETS - Normal    WBC Count 8.5      RBC Count 5.23      Hemoglobin 16.2      Hematocrit 46.0      MCV 88      MCH 31.0      MCHC 35.2      RDW 12.9      Platelet Count 240     TSH WITH FREE T4 REFLEX - Normal    TSH 1.35         Observation Course   The patient was found to have a psychiatric condition that  would benefit from an observation stay in the emergency department for further psychiatric stabilization and/or coordination of a safe disposition. The plan upon observation admission included serial assessments of psychiatric condition, potential administration of medications if indicated, further disposition pending the patient's psychiatric course during the monitoring period.     Serial assessments of the patient's psychiatric condition were performed. Nursing notes were reviewed. During the observation period, the patient did not require medications for agitation, and did not require restraints/seclusion for patient and/or provider safety.     After a period of working with the treatment team on the EmPATH unit, the patient's mental state improved to allow a safe transition to outpatient care. After counseling on the diagnosis, work-up, and treatment plan, the patient was discharged. Close follow-up with a psychiatrist and/or therapist was recommended and community psychiatric resources were provided. Patient is to return to the ED if any urgent or potentially life-threatening concerns.     Discharge Diagnoses:   Final diagnoses:   LAWRENCE (generalized anxiety disorder)   Drug-induced anxiety disorder (H) - r/o acute anxiety exacerbation secondary to ozempic overdose       Treatment Plan:  -Continue citalopram 20 mg daily for antianxiety treatment  -Continue mirtazapine 7.5 mg nightly for augmentation of citalopram with secondary benefits at lessening insomnia.  We reviewed the importance of monitoring weight and appetite while taking this medication.  -Continue gabapentin 300 mg as needed for reduction of severe anxiety  -Resume outpatient medication management appointments and psychotherapy  -Discharge home today.      At the time of discharge, the patient's acute suicide risk was determined to be low due to the following factors: Reduction in the intensity of mood/anxiety symptoms that preceded the admission,  denial of suicidal thoughts, denies feeling helpless or hopeless, not currently under the influence of alcohol or illicit substances, denies experiencing command hallucinations, no immediate access to firearms. The patient's acute risk could be higher if noncompliant with their treatment plan, medications, follow-up appointments or using illicit substances or alcohol. Protective factors include: social supports, stable housing    I spent more than 30 minutes on discharge day activities.    --  Duy Wilson MD  [facility] EMERGENCY DEPT  EmPATH Unit       Duy Wilson MD  11/25/24 [id number]

## 2024-11-25 NOTE — PROGRESS NOTES
RN checked in with patient. Patient became tearful. Stated he continues to feel anxious and does not understand what is going on. Patient requested to speak with RN for a while to process how he is feeling. RN spoke with patient in a sensory room. Patient stated he feels tension in his abdomen. Feels his brain and body are not matching up. States he feels his brain is telling him there is something wrong with his body, but there isn't. States the feeling comes on out of nowhere, and he starts to panic thinking about it. Patient states he felt better for a few hours after taking the gabapentin and propranolol, but then his symptoms returned. Is worried this feeling won't ever go away. RN provided reassurance. RN encouraged patient to use mindful techniques and provided patient with an ice pack to place on the bridge of his nose. Patient agreeable, and after a few minutes, patient stated it was helpful. RN also provided patient with an aroma therapy patch to aid in grounding himself. Patient expressed gratitude.

## 2024-11-26 ENCOUNTER — TELEPHONE (OUTPATIENT)
Dept: BEHAVIORAL HEALTH | Facility: CLINIC | Age: 69
End: 2024-11-26
Payer: COMMERCIAL

## 2024-11-26 ENCOUNTER — PATIENT OUTREACH (OUTPATIENT)
Dept: CARE COORDINATION | Facility: CLINIC | Age: 69
End: 2024-11-26
Payer: COMMERCIAL

## 2024-11-26 NOTE — PROGRESS NOTES
[facility]    Background: Transitional Care Management program identified per system criteria and reviewed by [facility] team for possible outreach.    Assessment: Upon chart review, [facility] Team member will not proceed with patient outreach related to this episode of Transitional Care Management program due to reason below:    Patient has active communication with a nurse, provider or care team for reason of post-hospital follow up plan.  Outreach call by [facility] team not indicated to minimize duplicative efforts.     Patient is in active communication today with a provider Behavioral Health from [facility] - Behavioral Service Line. Active communication is appropriate for ED/Hospital discharge and follow-up. No [facility] outreach call needed at this time.    Plan: Transitional Care Management episode addressed appropriately per reason noted above.      TRUE Keen  [phone]  [facility]     *Connected Care Resource Team does NOT follow patient ongoing. Referrals are identified based on internal discharge reports and the outreach is to ensure patient has an understanding of their discharge instructions.

## 2024-11-28 ENCOUNTER — TELEPHONE (OUTPATIENT)
Dept: BEHAVIORAL HEALTH | Facility: CLINIC | Age: 69
End: 2024-11-28
Payer: COMMERCIAL

## (undated) RX ORDER — FENTANYL CITRATE 50 UG/ML
INJECTION, SOLUTION INTRAMUSCULAR; INTRAVENOUS
Status: DISPENSED
Start: 2018-10-09